# Patient Record
Sex: FEMALE | Race: ASIAN
[De-identification: names, ages, dates, MRNs, and addresses within clinical notes are randomized per-mention and may not be internally consistent; named-entity substitution may affect disease eponyms.]

---

## 2021-01-04 ENCOUNTER — TRANSCRIPTION ENCOUNTER (OUTPATIENT)
Age: 21
End: 2021-01-04

## 2021-01-04 ENCOUNTER — APPOINTMENT (OUTPATIENT)
Dept: FAMILY MEDICINE | Facility: CLINIC | Age: 21
End: 2021-01-04
Payer: MEDICAID

## 2021-01-04 VITALS
SYSTOLIC BLOOD PRESSURE: 121 MMHG | BODY MASS INDEX: 20.66 KG/M2 | OXYGEN SATURATION: 99 % | DIASTOLIC BLOOD PRESSURE: 80 MMHG | TEMPERATURE: 98.7 F | WEIGHT: 124 LBS | HEART RATE: 84 BPM | HEIGHT: 65 IN

## 2021-01-04 DIAGNOSIS — N92.6 IRREGULAR MENSTRUATION, UNSPECIFIED: ICD-10-CM

## 2021-01-04 DIAGNOSIS — H61.20 IMPACTED CERUMEN, UNSPECIFIED EAR: ICD-10-CM

## 2021-01-04 DIAGNOSIS — Z00.00 ENCOUNTER FOR GENERAL ADULT MEDICAL EXAMINATION W/OUT ABNORMAL FINDINGS: ICD-10-CM

## 2021-01-04 DIAGNOSIS — Z13.31 ENCOUNTER FOR SCREENING FOR DEPRESSION: ICD-10-CM

## 2021-01-04 DIAGNOSIS — Z78.9 OTHER SPECIFIED HEALTH STATUS: ICD-10-CM

## 2021-01-04 PROCEDURE — 99385 PREV VISIT NEW AGE 18-39: CPT

## 2021-01-04 PROCEDURE — G0444 DEPRESSION SCREEN ANNUAL: CPT | Mod: NC,59

## 2021-01-04 PROCEDURE — 99072 ADDL SUPL MATRL&STAF TM PHE: CPT

## 2021-01-04 RX ORDER — VITAMIN E (DL,TOCOPHERYL ACET) 180 MG
CAPSULE ORAL
Refills: 0 | Status: ACTIVE | COMMUNITY

## 2021-01-04 RX ORDER — CHROMIUM 200 MCG
TABLET ORAL
Refills: 0 | Status: ACTIVE | COMMUNITY

## 2021-01-04 RX ORDER — MV-MIN/FOLIC/VIT K/LYCOP/COQ10 200-100MCG
CAPSULE ORAL
Refills: 0 | Status: ACTIVE | COMMUNITY

## 2021-01-04 NOTE — ASSESSMENT
[FreeTextEntry1] : Health maintenance\par - labs ordered\par - declines flu shot\par - up to date with pap\par - depression screening negative\par \par Pain in left arm\par - likely muscles soreness\par - monitor\par - heating pad/ NSAIDs as needed\par \par Cerumen \par - advised to use debrox for excessive wax\par - if needed can return for irrigation\par \par Late period\par - only one period off cycle\par - advised that this can be normal, may be related to increased stressors\par - will monitor period cycles and if remaining abnormal can follow up with GYN\par

## 2021-01-04 NOTE — HEALTH RISK ASSESSMENT
[Excellent] : ~his/her~ current health as excellent [Very Good] : ~his/her~  mood as very good [0] : 2) Feeling down, depressed, or hopeless: Not at all (0) [Patient reported mammogram was normal] : Patient reported mammogram was normal [HIV test declined] : HIV test declined [Hepatitis C test declined] : Hepatitis C test declined [None] : None [With Family] : lives with family [Student] : student [College] : College [Fully functional (bathing, dressing, toileting, transferring, walking, feeding)] : Fully functional (bathing, dressing, toileting, transferring, walking, feeding) [Fully functional (using the telephone, shopping, preparing meals, housekeeping, doing laundry, using] : Fully functional and needs no help or supervision to perform IADLs (using the telephone, shopping, preparing meals, housekeeping, doing laundry, using transportation, managing medications and managing finances) [Smoke Detector] : smoke detector [Safety elements used in home] : safety elements used in home [Seat Belt] :  uses seat belt [No] : In the past 12 months have you used drugs other than those required for medical reasons? No [No falls in past year] : Patient reported no falls in the past year [Patient reported PAP Smear was normal] : Patient reported PAP Smear was normal [Single] : single [Feels Safe at Home] : Feels safe at home [Patient/Caregiver not ready to engage] : Patient/Caregiver not ready to engage [] : No [de-identified] : tries to exercise  [de-identified] : well balanced  [[id number]] : 0 [Change in mental status noted] : No change in mental status noted [Language] : denies difficulty with language [Behavior] : denies difficulty with behavior [Reasoning] : denies difficulty with reasoning [Sexually Active] : not sexually active [Reports changes in hearing] : Reports no changes in hearing [Reports changes in vision] : Reports no changes in vision [PapSmearDate] : 1/2019

## 2021-01-04 NOTE — HEALTH RISK ASSESSMENT
[Excellent] : ~his/her~ current health as excellent [Very Good] : ~his/her~  mood as very good [0] : 2) Feeling down, depressed, or hopeless: Not at all (0) [Patient reported mammogram was normal] : Patient reported mammogram was normal [HIV test declined] : HIV test declined [Hepatitis C test declined] : Hepatitis C test declined [None] : None [With Family] : lives with family [Student] : student [College] : College [Fully functional (bathing, dressing, toileting, transferring, walking, feeding)] : Fully functional (bathing, dressing, toileting, transferring, walking, feeding) [Fully functional (using the telephone, shopping, preparing meals, housekeeping, doing laundry, using] : Fully functional and needs no help or supervision to perform IADLs (using the telephone, shopping, preparing meals, housekeeping, doing laundry, using transportation, managing medications and managing finances) [Smoke Detector] : smoke detector [Safety elements used in home] : safety elements used in home [Seat Belt] :  uses seat belt [No] : In the past 12 months have you used drugs other than those required for medical reasons? No [No falls in past year] : Patient reported no falls in the past year [Patient reported PAP Smear was normal] : Patient reported PAP Smear was normal [Single] : single [Feels Safe at Home] : Feels safe at home [Patient/Caregiver not ready to engage] : Patient/Caregiver not ready to engage [] : No [de-identified] : tries to exercise  [de-identified] : well balanced  [[id number]] : 0 [Change in mental status noted] : No change in mental status noted [Language] : denies difficulty with language [Behavior] : denies difficulty with behavior [Reasoning] : denies difficulty with reasoning [Sexually Active] : not sexually active [Reports changes in hearing] : Reports no changes in hearing [Reports changes in vision] : Reports no changes in vision [PapSmearDate] : 1/2019

## 2021-01-04 NOTE — PHYSICAL EXAM
[No Acute Distress] : no acute distress [Well Nourished] : well nourished [Well Developed] : well developed [Normal Sclera/Conjunctiva] : normal sclera/conjunctiva [PERRL] : pupils equal round and reactive to light [Normal Outer Ear/Nose] : the outer ears and nose were normal in appearance [No JVD] : no jugular venous distention [No Respiratory Distress] : no respiratory distress  [No Accessory Muscle Use] : no accessory muscle use [Normal Rate] : normal rate  [Regular Rhythm] : with a regular rhythm [No Carotid Bruits] : no carotid bruits [No Edema] : there was no peripheral edema [Soft] : abdomen soft [Non Tender] : non-tender [No HSM] : no HSM [Normal Supraclavicular Nodes] : no supraclavicular lymphadenopathy [No Rash] : no rash [No Skin Lesions] : no skin lesions [Coordination Grossly Intact] : coordination grossly intact [Normal Mood] : the mood was normal [Normal] : no joint swelling and grossly normal strength and tone [No Focal Deficits] : no focal deficits [Normal Gait] : normal gait [Normal Affect] : the affect was normal [Normal Insight/Judgement] : insight and judgment were intact [de-identified] : Full ROM of LUE, no defecits appreciated

## 2021-01-04 NOTE — HISTORY OF PRESENT ILLNESS
[FreeTextEntry1] : CPE [de-identified] : Patient here for CPE, last saw PMD 1 year ago. Denies any medical history, but admits hx of syncope with blood work. Does not take any medications. Patient is in school. Tries to exercise but does not have much motivation. Lives with parents and family. \par Patient complains of arm soreness on left side. Happens at night when she sleeps on it. No precipitating events or triggers. No pain. Did not take any medications. Symptoms typically self resolve after 10-15 minutes. Does not lift weights or do heavy exercise. \par Patient also notes her period has been 10 days late this month. Periods have previously been regular and denies changes in flow or pattern. Patient also complains of ear wax which she says is a chronic issue, required irrigation in the past. No ear pain or discharge.\par \par Did not have dental screening this year\par Refuses flu shot\par PAP- 2019 UTD\par

## 2021-01-04 NOTE — REVIEW OF SYSTEMS
[Fainting] : fainting [Fever] : no fever [Chills] : no chills [Night Sweats] : no night sweats [Discharge] : no discharge [Pain] : no pain [Vision Problems] : no vision problems [Hearing Loss] : no hearing loss [Nasal Discharge] : no nasal discharge [Chest Pain] : no chest pain [Palpitations] : no palpitations [Shortness Of Breath] : no shortness of breath [Wheezing] : no wheezing [Abdominal Pain] : no abdominal pain [Nausea] : no nausea [Vomiting] : no vomiting [Dysuria] : no dysuria [Incontinence] : no incontinence [Hematuria] : no hematuria [Joint Pain] : no joint pain [Joint Stiffness] : no joint stiffness [Muscle Pain] : no muscle pain [Itching] : no itching [Skin Rash] : no skin rash [Headache] : no headache [Dizziness] : no dizziness [Memory Loss] : no memory loss [Suicidal] : not suicidal [Insomnia] : no insomnia [Easy Bleeding] : no easy bleeding [Easy Bruising] : no easy bruising [de-identified] : hx of fainting with blood work

## 2021-01-04 NOTE — PHYSICAL EXAM
[No Acute Distress] : no acute distress [Well Nourished] : well nourished [Well Developed] : well developed [Normal Sclera/Conjunctiva] : normal sclera/conjunctiva [PERRL] : pupils equal round and reactive to light [Normal Outer Ear/Nose] : the outer ears and nose were normal in appearance [No JVD] : no jugular venous distention [No Respiratory Distress] : no respiratory distress  [No Accessory Muscle Use] : no accessory muscle use [Normal Rate] : normal rate  [Regular Rhythm] : with a regular rhythm [No Carotid Bruits] : no carotid bruits [No Edema] : there was no peripheral edema [Soft] : abdomen soft [Non Tender] : non-tender [No HSM] : no HSM [Normal Supraclavicular Nodes] : no supraclavicular lymphadenopathy [No Rash] : no rash [No Skin Lesions] : no skin lesions [Coordination Grossly Intact] : coordination grossly intact [Normal Mood] : the mood was normal [Normal] : no joint swelling and grossly normal strength and tone [No Focal Deficits] : no focal deficits [Normal Gait] : normal gait [Normal Affect] : the affect was normal [Normal Insight/Judgement] : insight and judgment were intact [de-identified] : Full ROM of LUE, no defecits appreciated

## 2021-01-04 NOTE — REVIEW OF SYSTEMS
[Fainting] : fainting [Fever] : no fever [Chills] : no chills [Night Sweats] : no night sweats [Discharge] : no discharge [Pain] : no pain [Vision Problems] : no vision problems [Hearing Loss] : no hearing loss [Nasal Discharge] : no nasal discharge [Chest Pain] : no chest pain [Palpitations] : no palpitations [Shortness Of Breath] : no shortness of breath [Wheezing] : no wheezing [Abdominal Pain] : no abdominal pain [Nausea] : no nausea [Vomiting] : no vomiting [Dysuria] : no dysuria [Incontinence] : no incontinence [Hematuria] : no hematuria [Joint Pain] : no joint pain [Joint Stiffness] : no joint stiffness [Muscle Pain] : no muscle pain [Itching] : no itching [Skin Rash] : no skin rash [Headache] : no headache [Dizziness] : no dizziness [Memory Loss] : no memory loss [Suicidal] : not suicidal [Insomnia] : no insomnia [Easy Bleeding] : no easy bleeding [Easy Bruising] : no easy bruising [de-identified] : hx of fainting with blood work

## 2021-01-04 NOTE — HISTORY OF PRESENT ILLNESS
[FreeTextEntry1] : CPE [de-identified] : Patient here for CPE, last saw PMD 1 year ago. Denies any medical history, but admits hx of syncope with blood work. Does not take any medications. Patient is in school. Tries to exercise but does not have much motivation. Lives with parents and family. \par Patient complains of arm soreness on left side. Happens at night when she sleeps on it. No precipitating events or triggers. No pain. Did not take any medications. Symptoms typically self resolve after 10-15 minutes. Does not lift weights or do heavy exercise. \par Patient also notes her period has been 10 days late this month. Periods have previously been regular and denies changes in flow or pattern. Patient also complains of ear wax which she says is a chronic issue, required irrigation in the past. No ear pain or discharge.\par \par Did not have dental screening this year\par Refuses flu shot\par PAP- 2019 UTD\par

## 2021-01-07 ENCOUNTER — TRANSCRIPTION ENCOUNTER (OUTPATIENT)
Age: 21
End: 2021-01-07

## 2021-01-07 LAB
ALBUMIN SERPL ELPH-MCNC: 4.6 G/DL
ALP BLD-CCNC: 87 U/L
ALT SERPL-CCNC: 14 U/L
ANION GAP SERPL CALC-SCNC: 12 MMOL/L
AST SERPL-CCNC: 19 U/L
BASOPHILS # BLD AUTO: 0.05 K/UL
BASOPHILS NFR BLD AUTO: 0.8 %
BILIRUB SERPL-MCNC: 0.8 MG/DL
BUN SERPL-MCNC: 13 MG/DL
CALCIUM SERPL-MCNC: 9.4 MG/DL
CHLORIDE SERPL-SCNC: 100 MMOL/L
CHOLEST SERPL-MCNC: 163 MG/DL
CO2 SERPL-SCNC: 27 MMOL/L
CREAT SERPL-MCNC: 0.79 MG/DL
EOSINOPHIL # BLD AUTO: 0.06 K/UL
EOSINOPHIL NFR BLD AUTO: 1 %
GLUCOSE SERPL-MCNC: 97 MG/DL
HCT VFR BLD CALC: 44.2 %
HDLC SERPL-MCNC: 61 MG/DL
HGB BLD-MCNC: 14.7 G/DL
IMM GRANULOCYTES NFR BLD AUTO: 0.3 %
LDLC SERPL CALC-MCNC: 85 MG/DL
LYMPHOCYTES # BLD AUTO: 1.79 K/UL
LYMPHOCYTES NFR BLD AUTO: 29.6 %
MAN DIFF?: NORMAL
MCHC RBC-ENTMCNC: 28.5 PG
MCHC RBC-ENTMCNC: 33.3 GM/DL
MCV RBC AUTO: 85.7 FL
MONOCYTES # BLD AUTO: 0.35 K/UL
MONOCYTES NFR BLD AUTO: 5.8 %
NEUTROPHILS # BLD AUTO: 3.77 K/UL
NEUTROPHILS NFR BLD AUTO: 62.5 %
NONHDLC SERPL-MCNC: 102 MG/DL
PLATELET # BLD AUTO: 187 K/UL
POTASSIUM SERPL-SCNC: 4.3 MMOL/L
PROT SERPL-MCNC: 7.5 G/DL
RBC # BLD: 5.16 M/UL
RBC # FLD: 11.8 %
SODIUM SERPL-SCNC: 139 MMOL/L
T4 FREE SERPL-MCNC: 1.3 NG/DL
TRIGL SERPL-MCNC: 84 MG/DL
TSH SERPL-ACNC: 2.1 UIU/ML
WBC # FLD AUTO: 6.04 K/UL

## 2021-01-08 ENCOUNTER — TRANSCRIPTION ENCOUNTER (OUTPATIENT)
Age: 21
End: 2021-01-08

## 2021-01-14 ENCOUNTER — TRANSCRIPTION ENCOUNTER (OUTPATIENT)
Age: 21
End: 2021-01-14

## 2021-01-26 ENCOUNTER — APPOINTMENT (OUTPATIENT)
Dept: ORTHOPEDIC SURGERY | Facility: CLINIC | Age: 21
End: 2021-01-26
Payer: MEDICAID

## 2021-01-26 DIAGNOSIS — M79.602 PAIN IN LEFT ARM: ICD-10-CM

## 2021-01-26 PROCEDURE — 99072 ADDL SUPL MATRL&STAF TM PHE: CPT

## 2021-01-26 PROCEDURE — 73030 X-RAY EXAM OF SHOULDER: CPT | Mod: LT

## 2021-01-26 PROCEDURE — 99204 OFFICE O/P NEW MOD 45 MIN: CPT

## 2021-01-27 NOTE — PHYSICAL EXAM
[de-identified] : Physical Exam:\par General: Well appearing, no acute distress\par Neurologic: A&Ox3, No focal deficits\par Head: NCAT without abrasions, lacerations, or ecchymosis to head, face, or scalp\par Eyes: No scleral icterus, no gross abnormalities\par Respiratory: Equal chest wall expansion bilaterally, no accessory muscle use\par Lymphatic: No lymphadenopathy palpated\par Skin: Warm and dry\par Psychiatric: Normal mood and affect\par \par C-Spine\par Palpation: Nontender to paraspinal muscles and trapezius muscle\par ROM: side bending, symmetrical. No pain with extension and flexion of the neck.\par Reflexes: C5-7 [normal]\par \par Left Shoulder\par ·	Inspection/Palpation: Tenderness to deltoid insertion, no swelling or deformities\par ·	Range of Motion: no crepitus with ROM; Active/Passive FF 0-170; ER at side 0-45; IR to T7 \par Painful ROM: pain to deltoid with resisted shoulder flexion\par ·	Strength: forward elevation in scapular plane [4/5], internal rotation [4/5], external rotation [4/5], adduction [4/5] and abduction [4/5]\par ·	Stability: no joint instability on provocative testing\par ·	Tests: Duffy test negative, Neer negative, drop arm test negative, bear hug test negative, Napolean sign negative, cross arm adduction negative, lift off sign negative, hornblowers sign negative, speeds test NEG, Yergason's test NEG, no bicipital groove tenderness, Mcmahon's Active Compression test POS, whipple test NEG, bicep's load II test negative\par \par RightShoulder\par ·	Inspection/Palpation: no tenderness, swelling or deformities\par ·	Range of Motion: full and painless in all planes, no crepitus\par ·	Strength: forward elevation in scapular plane 5/5, internal rotation 5/5, external rotation 5/5, adduction 5/5 and abduction 5/5\par ·	Stability: no joint instability on provocative testing\par ·	Tests: Duffy test negative, Neer sign negative, negative drop arm test secondary to pain, bear hug test negative, Napolean sign negative, cross arm adduction negative, lift off sign positive, hornblowers sign negative, speeds test negative, Yergason's test negative, no bicipital groove tenderness, Mcmahon's Active Compression test negative [de-identified] : The following radiographs were ordered and read by me during this patients visit. I reviewed each radiograph in detail with the patient and discussed the findings as highlighted below. \par \par 4 views of the left shoulder show no fracture, dislocation or bony lesions. There is no evidence of degenerative change in the glenohumeral and acromioclavicular joints with maintenance of the joint space. Otherwise unremarkable. Radiographs taken today visualize the entire humerus which is also WNL.

## 2021-01-27 NOTE — REVIEW OF SYSTEMS
[Negative] : Heme/Lymph [Joint Pain] : no joint pain [Joint Stiffness] : no joint stiffness [Joint Swelling] : no joint swelling [FreeTextEntry9] : left arm pain

## 2021-01-27 NOTE — HISTORY OF PRESENT ILLNESS
[de-identified] : NORMA is a 20 year female who presents today with complaints of L arm pain located over the deltoid insertion. She denies injury, numbness/tingling. She admits to this pain starting over a year ago but it is now more constant. She denies sports and admits to being in school but not working.

## 2021-01-27 NOTE — DISCUSSION/SUMMARY
[de-identified] : NORMA is a 20 year female who presents today with left arm pain secondary to deltoid tendonitis. We had a thorough discussion regarding the nature of her pain, the pathophysiology, as well as all treatment options. Reviewed her radiographs taken today that reveal no bony deformities or masses to this area and are WNL. At this time I recommend a course of Mobic to take as directed with food. She will d/c use if GI upset occurs. She will also start PT 2x/week for 4 weeks for this issue. If she is pain free at that time she will follow up with us on an prn basis. If she is refractory we will then consider an MRI. She agrees with the above plan and all questions were answered.\par

## 2021-01-28 RX ORDER — MELOXICAM 7.5 MG/1
7.5 TABLET ORAL
Qty: 21 | Refills: 0 | Status: COMPLETED | COMMUNITY
Start: 2021-01-26 | End: 2021-02-18

## 2021-02-13 ENCOUNTER — TRANSCRIPTION ENCOUNTER (OUTPATIENT)
Age: 21
End: 2021-02-13

## 2021-02-14 ENCOUNTER — TRANSCRIPTION ENCOUNTER (OUTPATIENT)
Age: 21
End: 2021-02-14

## 2021-03-04 ENCOUNTER — APPOINTMENT (OUTPATIENT)
Dept: ORTHOPEDIC SURGERY | Facility: CLINIC | Age: 21
End: 2021-03-04
Payer: MEDICAID

## 2021-03-04 PROCEDURE — ZZZZZ: CPT

## 2021-07-18 ENCOUNTER — TRANSCRIPTION ENCOUNTER (OUTPATIENT)
Age: 21
End: 2021-07-18

## 2021-08-02 ENCOUNTER — TRANSCRIPTION ENCOUNTER (OUTPATIENT)
Age: 21
End: 2021-08-02

## 2021-08-02 ENCOUNTER — NON-APPOINTMENT (OUTPATIENT)
Age: 21
End: 2021-08-02

## 2021-10-07 ENCOUNTER — APPOINTMENT (OUTPATIENT)
Dept: ORTHOPEDIC SURGERY | Facility: CLINIC | Age: 21
End: 2021-10-07
Payer: MEDICAID

## 2021-10-07 DIAGNOSIS — R29.3 ABNORMAL POSTURE: ICD-10-CM

## 2021-10-07 DIAGNOSIS — M77.8 OTHER ENTHESOPATHIES, NOT ELSEWHERE CLASSIFIED: ICD-10-CM

## 2021-10-07 DIAGNOSIS — M25.50 PAIN IN UNSPECIFIED JOINT: ICD-10-CM

## 2021-10-07 PROCEDURE — 73030 X-RAY EXAM OF SHOULDER: CPT | Mod: LT

## 2021-10-07 PROCEDURE — 99214 OFFICE O/P EST MOD 30 MIN: CPT

## 2021-10-07 RX ORDER — DICLOFENAC SODIUM 1% 10 MG/G
1 GEL TOPICAL
Qty: 1 | Refills: 0 | Status: ACTIVE | COMMUNITY
Start: 2021-10-07 | End: 1900-01-01

## 2021-10-07 NOTE — DISCUSSION/SUMMARY
[de-identified] : NORMA is a 20 year female who presents today with left arm pain secondary to deltoid tendonitis. We had a thorough discussion regarding the nature of her pain, the pathophysiology, as well as all treatment options. Reviewed her radiographs taken today that reveal no bony deformities or masses to this area and are WNL. At this time I recommend she start PT 2x/week for 4 weeks for this issue. She was prescribed voltaren gel due to her GI upset with oral NSAIDs. If she is pain free at that time she will follow up with us on an prn basis. If she is refractory we will then consider an MRI. She agrees with the above plan and all questions were answered.\par \par \par Of note she mentions new joint pain to BILAT wrists/ hands and within her fingers. Due to this I have referred her to a rheumatologist for bloodwork.

## 2021-10-07 NOTE — HISTORY OF PRESENT ILLNESS
[Worsening] : worsening [___ mths] : [unfilled] month(s) ago [2] : an average pain level of 2/10 [1] : a minimum pain level of 1/10 [3] : a maximum pain level of 3/10 [Lifting] : worsened by lifting [de-identified] : NORMA is a 20 year female who presents today with complaints of L shoulder pain. She was last here 01/26/2021 and d/x with deltoid tendinitis. Currently, she reports con't L shoulder pain, specifically with lifting. She states her pain is now radiating from her deltoid over her lateral shoulder to her neck. Patient denies numbness and tingling to the extremities. She reports no relief with meloxicam but does report GI upset so she was forced to stop it. She has not performed any physical therapy although suggested to but performed HEP as provided without relief. Her pain worsened after COVID vaccination 1.5 weeks ago. This was her second injection. Prior in the left arm was her first COVID vaccination which also caused an increase in her pain at that time.\par \par Of note she mentions new joint pain to BILAT wrists/ hands and within her fingers.

## 2021-10-07 NOTE — PHYSICAL EXAM
[de-identified] : Physical Exam:\par General: Well appearing, no acute distress\par Neurologic: A&Ox3, No focal deficits\par Head: NCAT without abrasions, lacerations, or ecchymosis to head, face, or scalp\par Eyes: No scleral icterus, no gross abnormalities\par Respiratory: Equal chest wall expansion bilaterally, no accessory muscle use\par Lymphatic: No lymphadenopathy palpated\par Skin: Warm and dry\par Psychiatric: Normal mood and affect\par \par C-Spine\par Palpation: Nontender to paraspinal muscles and trapezius muscle\par ROM: side bending, symmetrical. No pain with extension and flexion of the neck.\par Reflexes: C5-7 [normal]\par \par Left Shoulder\par ·	Inspection/Palpation: Tenderness to deltoid insertion, no swelling or deformities\par ·	Range of Motion: no crepitus with ROM; Active/Passive FF 0-170; ER at side 0-45; IR to T7 \par Painful ROM: pain to deltoid with resisted shoulder flexion\par ·	Strength: forward elevation in scapular plane [4/5], internal rotation [4/5], external rotation [4/5], adduction [4/5] and abduction [4/5]\par ·	Stability: no joint instability on provocative testing\par ·	Tests: Duffy test negative, Neer negative, drop arm test negative, bear hug test negative, Napolean sign negative, cross arm adduction negative, lift off sign negative, hornblowers sign negative, speeds test NEG, Yergason's test NEG, no bicipital groove tenderness, Mcmahon's Active Compression test POS, whipple test NEG, bicep's load II test negative\par \par RightShoulder\par ·	Inspection/Palpation: no tenderness, swelling or deformities\par ·	Range of Motion: full and painless in all planes, no crepitus\par ·	Strength: forward elevation in scapular plane 5/5, internal rotation 5/5, external rotation 5/5, adduction 5/5 and abduction 5/5\par ·	Stability: no joint instability on provocative testing\par ·	Tests: Duffy test negative, Neer sign negative, negative drop arm test secondary to pain, bear hug test negative, Napolean sign negative, cross arm adduction negative, lift off sign positive, hornblowers sign negative, speeds test negative, Yergason's test negative, no bicipital groove tenderness, Mcmahon's Active Compression test negative [de-identified] : The following radiographs were ordered and read by me during this patients visit. I reviewed each radiograph in detail with the patient and discussed the findings as highlighted below. \par \par 4 views of the left shoulder show no fracture, dislocation or bony lesions. There is no evidence of degenerative change in the glenohumeral and acromioclavicular joints with maintenance of the joint space. Otherwise unremarkable. Radiographs taken today visualize the entire humerus which is also WNL.

## 2021-11-03 ENCOUNTER — APPOINTMENT (OUTPATIENT)
Dept: DERMATOLOGY | Facility: CLINIC | Age: 21
End: 2021-11-03

## 2022-01-07 ENCOUNTER — APPOINTMENT (OUTPATIENT)
Dept: FAMILY MEDICINE | Facility: CLINIC | Age: 22
End: 2022-01-07

## 2022-03-01 ENCOUNTER — APPOINTMENT (OUTPATIENT)
Dept: RHEUMATOLOGY | Facility: CLINIC | Age: 22
End: 2022-03-01

## 2022-03-03 ENCOUNTER — APPOINTMENT (OUTPATIENT)
Dept: RHEUMATOLOGY | Facility: CLINIC | Age: 22
End: 2022-03-03

## 2022-06-12 ENCOUNTER — NON-APPOINTMENT (OUTPATIENT)
Age: 22
End: 2022-06-12

## 2022-07-10 ENCOUNTER — NON-APPOINTMENT (OUTPATIENT)
Age: 22
End: 2022-07-10

## 2022-07-15 ENCOUNTER — NON-APPOINTMENT (OUTPATIENT)
Age: 22
End: 2022-07-15

## 2022-09-17 ENCOUNTER — NON-APPOINTMENT (OUTPATIENT)
Age: 22
End: 2022-09-17

## 2022-11-11 ENCOUNTER — EMERGENCY (EMERGENCY)
Facility: HOSPITAL | Age: 22
LOS: 0 days | Discharge: HOME | End: 2022-11-11
Attending: EMERGENCY MEDICINE
Payer: MEDICAID

## 2022-11-11 VITALS
SYSTOLIC BLOOD PRESSURE: 124 MMHG | HEART RATE: 116 BPM | TEMPERATURE: 100 F | RESPIRATION RATE: 18 BRPM | DIASTOLIC BLOOD PRESSURE: 83 MMHG | OXYGEN SATURATION: 99 %

## 2022-11-11 VITALS — WEIGHT: 126.1 LBS | HEIGHT: 65 IN

## 2022-11-11 DIAGNOSIS — M54.2 CERVICALGIA: ICD-10-CM

## 2022-11-11 DIAGNOSIS — R50.9 FEVER, UNSPECIFIED: ICD-10-CM

## 2022-11-11 DIAGNOSIS — R06.02 SHORTNESS OF BREATH: ICD-10-CM

## 2022-11-11 DIAGNOSIS — Z20.822 CONTACT WITH AND (SUSPECTED) EXPOSURE TO COVID-19: ICD-10-CM

## 2022-11-11 DIAGNOSIS — B34.9 VIRAL INFECTION, UNSPECIFIED: ICD-10-CM

## 2022-11-11 LAB
RAPID RVP RESULT: SIGNIFICANT CHANGE UP
SARS-COV-2 RNA SPEC QL NAA+PROBE: SIGNIFICANT CHANGE UP

## 2022-11-11 PROCEDURE — 99284 EMERGENCY DEPT VISIT MOD MDM: CPT

## 2022-11-11 PROCEDURE — 71046 X-RAY EXAM CHEST 2 VIEWS: CPT | Mod: 26

## 2022-11-11 PROCEDURE — 0225U NFCT DS DNA&RNA 21 SARSCOV2: CPT

## 2022-11-11 PROCEDURE — 71046 X-RAY EXAM CHEST 2 VIEWS: CPT

## 2022-11-11 PROCEDURE — 99285 EMERGENCY DEPT VISIT HI MDM: CPT | Mod: 25

## 2022-11-11 PROCEDURE — 93010 ELECTROCARDIOGRAM REPORT: CPT

## 2022-11-11 PROCEDURE — 93005 ELECTROCARDIOGRAM TRACING: CPT

## 2022-11-11 RX ORDER — IBUPROFEN 200 MG
600 TABLET ORAL ONCE
Refills: 0 | Status: COMPLETED | OUTPATIENT
Start: 2022-11-11 | End: 2022-11-11

## 2022-11-11 RX ADMIN — Medication 600 MILLIGRAM(S): at 15:06

## 2022-11-11 NOTE — ED ADULT NURSE NOTE - OBJECTIVE STATEMENT
Patient is alert and oriented X3, presenting to the ER with c/o shortness of breath. Patient reports the shortness of breath started about 3-4 days ago. Denies CP, vomiting, recent sick contact, difficulty swallowing. Took antibiotic prior to arrival.

## 2022-11-11 NOTE — ED ADULT NURSE NOTE - CHIEF COMPLAINT QUOTE
Patient is alert and oriented X3, presenting to the ER with c/o shortness of breath. Patient reports "the shortness of breath started about 3-4 days ago. I went to the doctor 2-3 weeks ago for the swelling in my neck, they placed me on antibiotics. I had my follow up appointment 2 days ago and they placed me on a third round of antibiotics but its not getting better." Denies CP, vomiting, recent sick contact, difficulty swallowing. Took antibiotic prior to arrival. Patient taken in for EKG. Respirations even and unlabored, air way patent, O2 saturation 97% on room air, heart rate 108, no visible signs of distress noted.

## 2022-11-11 NOTE — ED ADULT TRIAGE NOTE - CHIEF COMPLAINT QUOTE
Patient is alert and oriented X3, presenting to the ER with c/o shortness of breath. Patient reports "the shortness of breath started about 3-4 days ago. I went to the doctor 2-3 weeks ago for the swelling in my neck, they placed me on antibiotics. I had my follow up appointment 2 days ago and they placed me on a third round of antibiotics but its not getting better." Denies CP, vomiting, recent sick contact, difficulty swallowing. Took antibiotic prior to arrival. Patient taken in for EKG. Patient is alert and oriented X3, presenting to the ER with c/o shortness of breath. Patient reports "the shortness of breath started about 3-4 days ago. I went to the doctor 2-3 weeks ago for the swelling in my neck, they placed me on antibiotics. I had my follow up appointment 2 days ago and they placed me on a third round of antibiotics but its not getting better." Denies CP, vomiting, recent sick contact, difficulty swallowing. Took antibiotic prior to arrival. Patient taken in for EKG. Respirations even and unlabored, air way patent, O2 saturation 97% on room air, heart rate 108, no visible signs of distress noted.

## 2022-11-11 NOTE — ED STATDOCS - PATIENT PORTAL LINK FT
You can access the FollowMyHealth Patient Portal offered by [facility] by registering at the following website: [email or web address]. By joining [employer]’s FollowMyHealth portal, you will also be able to view your health information using other applications (apps) compatible with our system.

## 2022-11-11 NOTE — ED STATDOCS - ENMT, MLM
Nasal mucosa clear.  Mouth with normal mucosa  Throat has no vesicles, no oropharyngeal exudates and uvula is midline. Tenderness anterior left cervical adenopathy, no skin changes, normal oropharynx, no trismus or drooling, normal ROM of neck

## 2022-11-11 NOTE — ED STATDOCS - NS ED MD DISPO DISCHARGE CCDA
US rt thora.  8F.  1.7 l thin straw colored fluid. No request for testing. Comp-none. Patient/Caregiver provided printed discharge information.

## 2022-11-11 NOTE — ED STATDOCS - PROGRESS NOTE DETAILS
pt aware of cxr results and will fu with swab results. pt agrees with plan and well appearing on dc. -Lindsay Todd PA-C

## 2022-11-11 NOTE — ED STATDOCS - OBJECTIVE STATEMENT
23 y/o female w/ no pertinent PMHx presents to the ED c/o SOB, fever, and body aches x3-4 days. Pt notes she saw PCP x2-3 weeks ago for neck swelling/pain and was placed on ABx, had a follow up 2-3 days ago and was placed on a 3rd round of abx as swelling wasn't getting better. Pt has been on Amoxicillin, Clindamycin, and Cephalexin, states the Clindamycin did help a little. Denies CP, vomiting, or sick contacts. No other complaints at this time. NKDA.

## 2022-11-13 ENCOUNTER — INPATIENT (INPATIENT)
Facility: HOSPITAL | Age: 22
LOS: 3 days | Discharge: HOME | DRG: 651 | End: 2022-11-17
Attending: FAMILY MEDICINE | Admitting: EMERGENCY MEDICINE
Payer: MEDICAID

## 2022-11-13 VITALS — HEIGHT: 65 IN | WEIGHT: 123.9 LBS

## 2022-11-13 DIAGNOSIS — R50.81 FEVER PRESENTING WITH CONDITIONS CLASSIFIED ELSEWHERE: ICD-10-CM

## 2022-11-13 DIAGNOSIS — D70.9 NEUTROPENIA, UNSPECIFIED: ICD-10-CM

## 2022-11-13 DIAGNOSIS — D69.6 THROMBOCYTOPENIA, UNSPECIFIED: ICD-10-CM

## 2022-11-13 DIAGNOSIS — E83.51 HYPOCALCEMIA: ICD-10-CM

## 2022-11-13 DIAGNOSIS — E43 UNSPECIFIED SEVERE PROTEIN-CALORIE MALNUTRITION: ICD-10-CM

## 2022-11-13 DIAGNOSIS — R59.0 LOCALIZED ENLARGED LYMPH NODES: ICD-10-CM

## 2022-11-13 PROBLEM — Z78.9 OTHER SPECIFIED HEALTH STATUS: Chronic | Status: ACTIVE | Noted: 2022-11-12

## 2022-11-13 LAB
ADD ON TEST-SPECIMEN IN LAB: SIGNIFICANT CHANGE UP
ADD ON TEST-SPECIMEN IN LAB: SIGNIFICANT CHANGE UP
ALBUMIN SERPL ELPH-MCNC: 3.3 G/DL — SIGNIFICANT CHANGE UP (ref 3.3–5)
ALP SERPL-CCNC: 67 U/L — SIGNIFICANT CHANGE UP (ref 40–120)
ALT FLD-CCNC: 29 U/L — SIGNIFICANT CHANGE UP (ref 12–78)
ANION GAP SERPL CALC-SCNC: 5 MMOL/L — SIGNIFICANT CHANGE UP (ref 5–17)
APPEARANCE UR: CLEAR — SIGNIFICANT CHANGE UP
AST SERPL-CCNC: 36 U/L — SIGNIFICANT CHANGE UP (ref 15–37)
BASOPHILS # BLD AUTO: 0 K/UL — SIGNIFICANT CHANGE UP (ref 0–0.2)
BASOPHILS NFR BLD AUTO: 0 % — SIGNIFICANT CHANGE UP (ref 0–2)
BILIRUB SERPL-MCNC: 0.6 MG/DL — SIGNIFICANT CHANGE UP (ref 0.2–1.2)
BILIRUB UR-MCNC: NEGATIVE — SIGNIFICANT CHANGE UP
BUN SERPL-MCNC: 8 MG/DL — SIGNIFICANT CHANGE UP (ref 7–23)
CALCIUM SERPL-MCNC: 8.5 MG/DL — SIGNIFICANT CHANGE UP (ref 8.5–10.1)
CHLORIDE SERPL-SCNC: 103 MMOL/L — SIGNIFICANT CHANGE UP (ref 96–108)
CO2 SERPL-SCNC: 28 MMOL/L — SIGNIFICANT CHANGE UP (ref 22–31)
COLOR SPEC: YELLOW — SIGNIFICANT CHANGE UP
CREAT SERPL-MCNC: 0.74 MG/DL — SIGNIFICANT CHANGE UP (ref 0.5–1.3)
CULTURE RESULTS: SIGNIFICANT CHANGE UP
DIFF PNL FLD: ABNORMAL
EGFR: 117 ML/MIN/1.73M2 — SIGNIFICANT CHANGE UP
EOSINOPHIL # BLD AUTO: 0 K/UL — SIGNIFICANT CHANGE UP (ref 0–0.5)
EOSINOPHIL NFR BLD AUTO: 0 % — SIGNIFICANT CHANGE UP (ref 0–6)
GLUCOSE SERPL-MCNC: 104 MG/DL — HIGH (ref 70–99)
GLUCOSE UR QL: NEGATIVE — SIGNIFICANT CHANGE UP
HCT VFR BLD CALC: 38.3 % — SIGNIFICANT CHANGE UP (ref 34.5–45)
HGB BLD-MCNC: 12.8 G/DL — SIGNIFICANT CHANGE UP (ref 11.5–15.5)
IMM GRANULOCYTES NFR BLD AUTO: 0.9 % — SIGNIFICANT CHANGE UP (ref 0–0.9)
KETONES UR-MCNC: ABNORMAL
LACTATE SERPL-SCNC: 0.7 MMOL/L — SIGNIFICANT CHANGE UP (ref 0.7–2)
LEUKOCYTE ESTERASE UR-ACNC: ABNORMAL
LYMPHOCYTES # BLD AUTO: 0.41 K/UL — LOW (ref 1–3.3)
LYMPHOCYTES # BLD AUTO: 37.3 % — SIGNIFICANT CHANGE UP (ref 13–44)
MCHC RBC-ENTMCNC: 26.9 PG — LOW (ref 27–34)
MCHC RBC-ENTMCNC: 33.4 GM/DL — SIGNIFICANT CHANGE UP (ref 32–36)
MCV RBC AUTO: 80.5 FL — SIGNIFICANT CHANGE UP (ref 80–100)
MONOCYTES # BLD AUTO: 0.12 K/UL — SIGNIFICANT CHANGE UP (ref 0–0.9)
MONOCYTES NFR BLD AUTO: 10.9 % — SIGNIFICANT CHANGE UP (ref 2–14)
NEUTROPHILS # BLD AUTO: 0.56 K/UL — LOW (ref 1.8–7.4)
NEUTROPHILS NFR BLD AUTO: 50.9 % — SIGNIFICANT CHANGE UP (ref 43–77)
NITRITE UR-MCNC: NEGATIVE — SIGNIFICANT CHANGE UP
PH UR: 7 — SIGNIFICANT CHANGE UP (ref 5–8)
PLATELET # BLD AUTO: 72 K/UL — LOW (ref 150–400)
POTASSIUM SERPL-MCNC: 3.9 MMOL/L — SIGNIFICANT CHANGE UP (ref 3.5–5.3)
POTASSIUM SERPL-SCNC: 3.9 MMOL/L — SIGNIFICANT CHANGE UP (ref 3.5–5.3)
PROT SERPL-MCNC: 7.6 GM/DL — SIGNIFICANT CHANGE UP (ref 6–8.3)
PROT UR-MCNC: 30 MG/DL
RAPID RVP RESULT: SIGNIFICANT CHANGE UP
RBC # BLD: 4.76 M/UL — SIGNIFICANT CHANGE UP (ref 3.8–5.2)
RBC # FLD: 12 % — SIGNIFICANT CHANGE UP (ref 10.3–14.5)
S PYO AG SPEC QL IA: NEGATIVE — SIGNIFICANT CHANGE UP
SARS-COV-2 RNA SPEC QL NAA+PROBE: SIGNIFICANT CHANGE UP
SODIUM SERPL-SCNC: 136 MMOL/L — SIGNIFICANT CHANGE UP (ref 135–145)
SP GR SPEC: 1 — LOW (ref 1.01–1.02)
SPECIMEN SOURCE: SIGNIFICANT CHANGE UP
UROBILINOGEN FLD QL: 4
WBC # BLD: 1.1 K/UL — LOW (ref 3.8–10.5)
WBC # FLD AUTO: 1.1 K/UL — LOW (ref 3.8–10.5)

## 2022-11-13 PROCEDURE — 87040 BLOOD CULTURE FOR BACTERIA: CPT

## 2022-11-13 PROCEDURE — 83735 ASSAY OF MAGNESIUM: CPT

## 2022-11-13 PROCEDURE — 88305 TISSUE EXAM BY PATHOLOGIST: CPT

## 2022-11-13 PROCEDURE — 82550 ASSAY OF CK (CPK): CPT

## 2022-11-13 PROCEDURE — 85652 RBC SED RATE AUTOMATED: CPT

## 2022-11-13 PROCEDURE — 87389 HIV-1 AG W/HIV-1&-2 AB AG IA: CPT

## 2022-11-13 PROCEDURE — 83550 IRON BINDING TEST: CPT

## 2022-11-13 PROCEDURE — 87206 SMEAR FLUORESCENT/ACID STAI: CPT

## 2022-11-13 PROCEDURE — 99223 1ST HOSP IP/OBS HIGH 75: CPT

## 2022-11-13 PROCEDURE — 81340 TRB@ GENE REARRANGE AMPLIFY: CPT

## 2022-11-13 PROCEDURE — 81207 BCR/ABL1 GENE MINOR BP: CPT

## 2022-11-13 PROCEDURE — 81261 IGH GENE REARRANGE AMP METH: CPT

## 2022-11-13 PROCEDURE — 86663 EPSTEIN-BARR ANTIBODY: CPT

## 2022-11-13 PROCEDURE — 84100 ASSAY OF PHOSPHORUS: CPT

## 2022-11-13 PROCEDURE — 81342 TRG GENE REARRANGEMENT ANAL: CPT

## 2022-11-13 PROCEDURE — 83605 ASSAY OF LACTIC ACID: CPT

## 2022-11-13 PROCEDURE — 87102 FUNGUS ISOLATION CULTURE: CPT

## 2022-11-13 PROCEDURE — 86665 EPSTEIN-BARR CAPSID VCA: CPT

## 2022-11-13 PROCEDURE — 81206 BCR/ABL1 GENE MAJOR BP: CPT

## 2022-11-13 PROCEDURE — 87070 CULTURE OTHR SPECIMN AEROBIC: CPT

## 2022-11-13 PROCEDURE — 88172 CYTP DX EVAL FNA 1ST EA SITE: CPT

## 2022-11-13 PROCEDURE — 85025 COMPLETE CBC W/AUTO DIFF WBC: CPT

## 2022-11-13 PROCEDURE — 86359 T CELLS TOTAL COUNT: CPT

## 2022-11-13 PROCEDURE — 83615 LACTATE (LD) (LDH) ENZYME: CPT

## 2022-11-13 PROCEDURE — 84550 ASSAY OF BLOOD/URIC ACID: CPT

## 2022-11-13 PROCEDURE — 99285 EMERGENCY DEPT VISIT HI MDM: CPT

## 2022-11-13 PROCEDURE — 87205 SMEAR GRAM STAIN: CPT

## 2022-11-13 PROCEDURE — 88185 FLOWCYTOMETRY/TC ADD-ON: CPT

## 2022-11-13 PROCEDURE — 82728 ASSAY OF FERRITIN: CPT

## 2022-11-13 PROCEDURE — 38505 NEEDLE BIOPSY LYMPH NODES: CPT

## 2022-11-13 PROCEDURE — 83540 ASSAY OF IRON: CPT

## 2022-11-13 PROCEDURE — 81264 IGK REARRANGEABN CLONAL POP: CPT

## 2022-11-13 PROCEDURE — 87086 URINE CULTURE/COLONY COUNT: CPT

## 2022-11-13 PROCEDURE — 0225U NFCT DS DNA&RNA 21 SARSCOV2: CPT

## 2022-11-13 PROCEDURE — 88237 TISSUE CULTURE BONE MARROW: CPT

## 2022-11-13 PROCEDURE — 76536 US EXAM OF HEAD AND NECK: CPT | Mod: 26

## 2022-11-13 PROCEDURE — 81219 CALR GENE COM VARIANTS: CPT

## 2022-11-13 PROCEDURE — 85610 PROTHROMBIN TIME: CPT

## 2022-11-13 PROCEDURE — 80048 BASIC METABOLIC PNL TOTAL CA: CPT

## 2022-11-13 PROCEDURE — 86664 EPSTEIN-BARR NUCLEAR ANTIGEN: CPT

## 2022-11-13 PROCEDURE — 87798 DETECT AGENT NOS DNA AMP: CPT

## 2022-11-13 PROCEDURE — 87116 MYCOBACTERIA CULTURE: CPT

## 2022-11-13 PROCEDURE — 86357 NK CELLS TOTAL COUNT: CPT

## 2022-11-13 PROCEDURE — 81245 FLT3 GENE: CPT

## 2022-11-13 PROCEDURE — 81270 JAK2 GENE: CPT

## 2022-11-13 PROCEDURE — 74177 CT ABD & PELVIS W/CONTRAST: CPT

## 2022-11-13 PROCEDURE — 80061 LIPID PANEL: CPT

## 2022-11-13 PROCEDURE — 81001 URINALYSIS AUTO W/SCOPE: CPT

## 2022-11-13 PROCEDURE — 85384 FIBRINOGEN ACTIVITY: CPT

## 2022-11-13 PROCEDURE — 87015 SPECIMEN INFECT AGNT CONCNTJ: CPT

## 2022-11-13 PROCEDURE — 85730 THROMBOPLASTIN TIME PARTIAL: CPT

## 2022-11-13 PROCEDURE — 73060 X-RAY EXAM OF HUMERUS: CPT | Mod: 26,LT

## 2022-11-13 PROCEDURE — 70491 CT SOFT TISSUE NECK W/DYE: CPT | Mod: 26,MG

## 2022-11-13 PROCEDURE — 76942 ECHO GUIDE FOR BIOPSY: CPT

## 2022-11-13 PROCEDURE — 87075 CULTR BACTERIA EXCEPT BLOOD: CPT

## 2022-11-13 PROCEDURE — 36415 COLL VENOUS BLD VENIPUNCTURE: CPT

## 2022-11-13 PROCEDURE — 83880 ASSAY OF NATRIURETIC PEPTIDE: CPT

## 2022-11-13 PROCEDURE — 80053 COMPREHEN METABOLIC PANEL: CPT

## 2022-11-13 PROCEDURE — 86618 LYME DISEASE ANTIBODY: CPT

## 2022-11-13 PROCEDURE — 86355 B CELLS TOTAL COUNT: CPT

## 2022-11-13 PROCEDURE — 84443 ASSAY THYROID STIM HORMONE: CPT

## 2022-11-13 PROCEDURE — 83036 HEMOGLOBIN GLYCOSYLATED A1C: CPT

## 2022-11-13 PROCEDURE — 86308 HETEROPHILE ANTIBODY SCREEN: CPT

## 2022-11-13 PROCEDURE — 86360 T CELL ABSOLUTE COUNT/RATIO: CPT

## 2022-11-13 PROCEDURE — 73030 X-RAY EXAM OF SHOULDER: CPT | Mod: 26,LT

## 2022-11-13 PROCEDURE — 88312 SPECIAL STAINS GROUP 1: CPT

## 2022-11-13 PROCEDURE — G1004: CPT

## 2022-11-13 PROCEDURE — 71260 CT THORAX DX C+: CPT

## 2022-11-13 PROCEDURE — 82607 VITAMIN B-12: CPT

## 2022-11-13 RX ORDER — SODIUM CHLORIDE 9 MG/ML
1000 INJECTION INTRAMUSCULAR; INTRAVENOUS; SUBCUTANEOUS ONCE
Refills: 0 | Status: COMPLETED | OUTPATIENT
Start: 2022-11-13 | End: 2022-11-13

## 2022-11-13 RX ORDER — ACETAMINOPHEN 500 MG
650 TABLET ORAL ONCE
Refills: 0 | Status: COMPLETED | OUTPATIENT
Start: 2022-11-13 | End: 2022-11-13

## 2022-11-13 RX ORDER — CEFEPIME 1 G/1
2000 INJECTION, POWDER, FOR SOLUTION INTRAMUSCULAR; INTRAVENOUS EVERY 8 HOURS
Refills: 0 | Status: DISCONTINUED | OUTPATIENT
Start: 2022-11-13 | End: 2022-11-17

## 2022-11-13 RX ORDER — CEFEPIME 1 G/1
1000 INJECTION, POWDER, FOR SOLUTION INTRAMUSCULAR; INTRAVENOUS ONCE
Refills: 0 | Status: DISCONTINUED | OUTPATIENT
Start: 2022-11-13 | End: 2022-11-13

## 2022-11-13 RX ORDER — IBUPROFEN 200 MG
600 TABLET ORAL ONCE
Refills: 0 | Status: DISCONTINUED | OUTPATIENT
Start: 2022-11-13 | End: 2022-11-17

## 2022-11-13 RX ORDER — ACYCLOVIR SODIUM 500 MG
280 VIAL (EA) INTRAVENOUS EVERY 8 HOURS
Refills: 0 | Status: DISCONTINUED | OUTPATIENT
Start: 2022-11-13 | End: 2022-11-14

## 2022-11-13 RX ORDER — L.ACIDOPH/B.ANIMALIS/B.LONGUM 15B CELL
1 CAPSULE ORAL
Qty: 0 | Refills: 0 | DISCHARGE

## 2022-11-13 RX ORDER — ACETAMINOPHEN 500 MG
650 TABLET ORAL ONCE
Refills: 0 | Status: COMPLETED | OUTPATIENT
Start: 2022-11-13 | End: 2022-11-17

## 2022-11-13 RX ORDER — ACYCLOVIR SODIUM 500 MG
280 VIAL (EA) INTRAVENOUS ONCE
Refills: 0 | Status: COMPLETED | OUTPATIENT
Start: 2022-11-13 | End: 2022-11-13

## 2022-11-13 RX ORDER — ACYCLOVIR SODIUM 500 MG
VIAL (EA) INTRAVENOUS
Refills: 0 | Status: DISCONTINUED | OUTPATIENT
Start: 2022-11-13 | End: 2022-11-14

## 2022-11-13 RX ORDER — CEFEPIME 1 G/1
1000 INJECTION, POWDER, FOR SOLUTION INTRAMUSCULAR; INTRAVENOUS ONCE
Refills: 0 | Status: COMPLETED | OUTPATIENT
Start: 2022-11-13 | End: 2022-11-13

## 2022-11-13 RX ADMIN — Medication 80 MILLIGRAM(S): at 22:37

## 2022-11-13 RX ADMIN — CEFEPIME 100 MILLIGRAM(S): 1 INJECTION, POWDER, FOR SOLUTION INTRAMUSCULAR; INTRAVENOUS at 22:43

## 2022-11-13 RX ADMIN — SODIUM CHLORIDE 1000 MILLILITER(S): 9 INJECTION INTRAMUSCULAR; INTRAVENOUS; SUBCUTANEOUS at 14:58

## 2022-11-13 RX ADMIN — Medication 105.6 MILLIGRAM(S): at 17:24

## 2022-11-13 RX ADMIN — CEFEPIME 1000 MILLIGRAM(S): 1 INJECTION, POWDER, FOR SOLUTION INTRAMUSCULAR; INTRAVENOUS at 14:59

## 2022-11-13 RX ADMIN — SODIUM CHLORIDE 1000 MILLILITER(S): 9 INJECTION INTRAMUSCULAR; INTRAVENOUS; SUBCUTANEOUS at 11:46

## 2022-11-13 RX ADMIN — Medication 650 MILLIGRAM(S): at 12:21

## 2022-11-13 NOTE — ED STATDOCS - PHYSICAL EXAMINATION
Constitutional: NAD, well appearing  HEENT: no rhinorrhea, PERRL, no oropharyngeal erythema or exudates, midline uvula.  TMs clear. minor swelling on SCM, mildly edematous L tonsil  CVS:  RRR, no m/r/g  Resp:  CTAB  GI: soft, ntnd  MSK:  no restriction to rom, full ROM to all extremities  Neuro:  A&Ox3, 5/5 strength to all extremities,  SILT to all extremities  Skin: no rash  psych: clear thought content  Heme/lymph:  No LAD

## 2022-11-13 NOTE — ED ADULT TRIAGE NOTE - CHIEF COMPLAINT QUOTE
lump to right lateral neck present x1 month, had imaging performed at . evaluated by PCP who prescribed abx. having fevers of 102/103 x1 week with decreased PO intake. + fatigue and night sweats.

## 2022-11-13 NOTE — ED STATDOCS - NS ED ROS FT
Constitutional: nad, well appearing, +pain  HEENT:  no nasal congestion, eye drainage or ear pain.  +neck swelling and apin  CVS:  no cp  Resp:  No sob, no cough  GI:  no abdominal pain, no nausea or vomiting  :  no dysuria  MSK: no joint pain or limited ROM  Skin: no rash  Neuro: no change in mental status or level of consciousness  Heme/lymph: no bleeding

## 2022-11-13 NOTE — ED STATDOCS - OBJECTIVE STATEMENT
21 y/o F with no pertinent PMHx presents to the ED c/o neck swelling and pain x 1 month. States having fevers, chills, and night sweats. Pt has been on Amoxicillin, Clindamycin, and Cephalexin, states the Clindamycin did help a little. Denies sick contacts to tuberculosis. Last took motrin at 12 AM. Denies hemoptysis, trauma, itching, or difficulty swallowing. No recent travels. 23 y/o F with no pertinent PMHx presents to the ED c/o neck swelling and pain x 1 month. States having fevers, chills, and night sweats. Pt has been on Amoxicillin, Clindamycin, and Cephalexin, states the Clindamycin did help a little. Denies sick contacts to tuberculosis. Last took motrin at 12 AM. pt was here on 11/11 was d/c with no remarkable findings on bedside US. Denies hemoptysis, trauma, itching, or difficulty swallowing. No recent travels. pt has a fever of 101.2 F in ED.

## 2022-11-13 NOTE — ED STATDOCS - PROGRESS NOTE DETAILS
23 y/o F with no PMH presents with left sided neck pain/swelling x 1 month. Pt reports associated fever, chills. Has been on multiple antibiotics including amoxicillin, clindamycin, currently on keflex. No known sick contacts. Pt was in ED on 11/11 and discharged home. Denies hemoptysis, trauma, neck stiffness, numbness/tingling in extremities, rash. PE: Well appearing. HEENT: PERRLA, EOMI. No oropharyngeal erythema, edema, tonsilar enlargement/exudates. +left sided anterior cervical lymphadenopathy. Cardiac: s1s2, RRR. Lungs; CTAB. Abdomen: NBS x4, soft, nontender. A/P; ?abscess. Plan for labs, US neck, will consider CT, reassess. - Christophe Tenorio PA-C WBC 1.1. Noted left sided lymphadenopathy on US. Will add lyme, babesia, HIV, mono screen, blood cultures, lactate, CT neck soft tissue, reassess. - Christophe Tenorio PA-C Pt with no known FMH of [place]. Findings reviewed with patient and Dr. Hudson. Expressed concern for malignancy with patient. She reports chronic left shoulder pain, which has been evaluated by orthopedics. Unsure if she's had imaging. XRs in ED unremarkable. Will start cefepime, admission for neutropenic fever. Oncology paged. - Christophe Tenorio PA-C

## 2022-11-13 NOTE — ED STATDOCS - WR ORDER NAME 2
DISCHARGE INSTRUCTIONS FOR [facility]:    Tell all your healthcare providers that you are on this medication. Have your blood and urine checked as instructed by your doctor. Please notify your doctor if you have have signs of high blood sugar like confusion, feeling sleepy, increased thirst, frequent urination or breath that smells like fruit. Avoid sun, sun lamps, and tanning beds. Use sunscreen and clothing to protect your skin and wear sunglasses to protect your eyes. Your chances of infection are increased. Wash hands frequently and stay away from people with colds. Roport these signs to your doctor:  Weakness  Confusion  Muscle pain  Blood in the urine, or pain when passing urine  Weight loss  Night sweats  Swollen glands    Go to the Emergency Room with sudden onset of Shortness of breath or chest pains. Thank you for choosing [facility] for your care. It is our pleasure to serve you.        Outpatient Infusion Unit   [phone]    8AM-5PM Xray Shoulder 2 Views, Left

## 2022-11-13 NOTE — H&P ADULT - HISTORY OF PRESENT ILLNESS
21 [facility] month fevers and L neck swelling. She reports B symptoms including L neck swelling, fevers worse at night, aching, white plaquing of tongue, mild malaise. She reports 1 month ago she had what she thought was an ear infection and then developed the swelling in her neck, followed by fevers after the swelling occurred. She says that she has been prescribed several antibiotics that have not seemed to make a difference and came to the ED when the neck felt more swollen and painful despite recent change of antibiotic. She denies any FH of lymphomas, interaction with sick individuals, or any remote travel within the past 5 years. ROS neg for sob, cp, n/v/d but she has restriction of movement due to pain from L side of neck. In ED she was given cefepime and an US then a CT of her neck were performed.     Vitals:  T(C): 38.4 (11-13-22 @ 11:19), Max: 38.4 (11-13-22 @ 11:19)  HR: 108 (11-13-22 @ 11:19) (108 - 108)  BP: 108/76 (11-13-22 @ 11:19) (108/76 - 108/76)  RR: 18 (11-13-22 @ 11:19) (18 - 18)  SpO2: 97% (11-13-22 @ 11:19) (97% - 97%)  Wt(kg): --  I&O's Summary    Height (cm): 165.1 (11-13 @ 09:58), 165.1 (11-11 @ 14:10)  Weight (kg): 56.2 (11-13 @ 09:58), 57.2 (11-11 @ 14:10)  BMI (kg/m2): 20.6 (11-13 @ 09:58), 21 (11-11 @ 14:10)    PHYSICAL EXAM:  Appearance: Comfortable. No acute distress  HEENT:  Head and neck: Atraumatic. Normocephalic. B/L SCM swelling noted, less unilateral than expected from CTA.  Normal oral mucosa apart from white coat on tongue which is diffuse and thin, not foul smelling. PERRL, Neck has tenderness throughout, in particular L SCM but also present R side. No JVD,   Neurologic: A & O x 3, no focal deficits. EOMI , Cranial nerves are intact.  Cardiovascular: Normal S1 S2, No murmur, rubs/gallops. No JVD, No edema  Respiratory: Lungs clear to auscultation  Gastrointestinal:  Soft, Non-tender, + BS  Lower Extremities: No edema  Psychiatry: Patient is calm. No agitation. Mood & affect appropriate  Skin: No rashes/ ecchymoses/cyanosis/ulcers visualized on the face, hands or feet.    CURRENT MEDICATIONS:    methylPREDNISolone sodium succinate Injectable      LABS:	 	                              12.8   1.10  )-----------( 72       ( 13 Nov 2022 11:47 )             38.3     11-13    136  |  103  |  8   ----------------------------<  104<H>  3.9   |  28  |  0.74    Ca    8.5      13 Nov 2022 11:47    TPro  7.6  /  Alb  3.3  /  TBili  0.6  /  DBili  x   /  AST  36  /  ALT  29  /  AlkPhos  67  11-13  < from: CT Neck Soft Tissue w/ IV Cont (11.13.22 @ 12:58) >  FINDINGS:  Deep to the left sternocleidomastoid muscle there are 2 enhancing mass   lesions (sagittal 602-101). The more superiorly located mass lesion   measures 2.1 cm in height x 1.6 cm in AP diameter x 1.5 cm in width, with   peripheral enhancement and central decreased attenuation, possibly   representing central necrosis. The more inferiorly located mass lesion   measures approximately 1.5 cm in height  x 1.1 cm in AP diameter x 1.8 cm   in width. Asymmetric prominence of the left submandibular gland.    Asymmetricprominence of the left tonsillar pillar at the level the   oropharynx, without a discrete peritonsillar abscess collection.    Airway is patent.    PAROTID GLANDS:  Unremarkable.    THYROID GLAND:  Unremarkable.  VISUALIZED PARANASAL SINUSES:  Unremarkable.  VISUALIZED TYMPANOMASTOID CAVITIES: Unremarkable.  BONES:  Unremarkable.  MISCELLANEOUS:  Unremarkable.      IMPRESSION:  1. 2 enhancing mass lesions are appreciated deep to the left   sternocleidomastoid muscle, concerning for pathologic appearing level III   lymph nodes. While an infectious etiology is within the differential   diagnosis, findings are also concerning for the possibility of a   neoplastic process. Further assessment is advised. Consider soft tissue   sampling.  2. Asymmetric prominence of the left tonsillar pillar without evidence of   a peritonsillar abscess collection.  3. Asymmetric prominence of the left submandibular gland. As the right   submandibular gland is not clearly visualized, it is unclear if this   finding is inflammatory in etiology, congenital or postsurgical.   Correlate with patient history.    Findings were communicated by Dr. Behr Ventura to MICHELLE Tenorio in the   emergency department at approximately 1:30 PM on 11/13/2022.    < end of copied text >  < from: US Head + Neck Soft Tissue (11.13.22 @ 12:18) >  FINDINGS:  Right Lobe: 4.5 cm x  1.5 cm x 0.6 cm. Homogeneous echogenicity. No   nodule.    Left Lobe: 4.3 cm x 1.1 cm x 0.8 cm. Homogeneous echogenicity. No nodule.    Isthmus: 7 cm. No nodule.    CervicalLymph Nodes: Several enlarged lymph nodes with thickened cortex   in the left anterior cervical chain. Reference nodes as follows:  Level 3 lymph node measures 2.5 x 1.4 cm  Level 4 lymph node measures 1.5 x 1.1 cm.    IMPRESSION:    Lymph node enlargement left levels 3 and 4.    Normal appearance of the thyroid gland.      < end of copied text >  < from: Xray Chest 2 Views PA/Lat (11.11.22 @ 15:05) >  PA lateral.    Heart and mediastinum normal.  Lungs clear.  Costophrenic angles sharp   bilaterally.    IMPRESSION: Normal chest    < end of copied text >

## 2022-11-13 NOTE — ED ADULT NURSE NOTE - OBJECTIVE STATEMENT
Pt to Ed with c/o lump to right lateral neck present x1 month, had imaging performed at . evaluated by PCP who prescribed abx. having fevers of 102/103 x1 week with decreased PO intake. + fatigue and night sweats. Pt sent to Ct, awaiting tylenol Po for pain

## 2022-11-13 NOTE — PATIENT PROFILE ADULT - FUNCTIONAL ASSESSMENT - BASIC MOBILITY 5.
Psychiatric Evaluation - [facility] 47 y o  female MRN: [id number]  Unit/Bed#: -01 Encounter: [id number]    

## 2022-11-13 NOTE — ED STATDOCS - CLINICAL SUMMARY MEDICAL DECISION MAKING FREE TEXT BOX
Will evaluate for lymphadenitis, lymphadenopathy, likely abscess. Dispo pending labs, imaging, and reassessment. Will evaluate for lymphadenitis, lymphadenopathy,  less likely abscess. Dispo pending labs, imaging, and reassessment.

## 2022-11-13 NOTE — H&P ADULT - ASSESSMENT
23 [facility] fevers and L neck swelling. She reports B symptoms including L neck swelling, fevers worse at night, aching, white plaquing of tongue, mild malaise. She reports 1 month ago she had what she thought was an ear infection and then developed the swelling in her neck, followed by fevers after the swelling occurred. She says that she has been prescribed several antibiotics that have not seemed to make a difference and came to the ED when the neck felt more swollen and painful despite recent change of antibiotic. She denies any FH of lymphomas, interaction with sick individuals, or any remote travel within the past 5 years. ROS neg for sob, cp, n/v/d but she has restriction of movement due to pain from L side of neck. In ED she was given cefepime and an US then a CT of her neck were performed.     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   Given above clinical hx suspect viral/lymphoid process as opposed to bacterial infectious and will empirically continue cefepime for now however the concern is for EBV related sequelae.   If worsening of symptoms, discontinue steroids immediately or if ANC drops below 500 stop acyclovir.  Heme/onc, ID evaluation requested  Tylenol PRN  Follow up serum studies, EBV testing, HIV    Bicytopenia  Viral vs lymphoma induced  Steroids and acyclovir  Check CBC with differential daily, if ANC <500 stop antiviral  Consultation with heme/onc and ID.    VTE ppx SCD

## 2022-11-14 LAB
A1C WITH ESTIMATED AVERAGE GLUCOSE RESULT: 5.3 % — SIGNIFICANT CHANGE UP (ref 4–5.6)
ALBUMIN SERPL ELPH-MCNC: 2.9 G/DL — LOW (ref 3.3–5)
ALP SERPL-CCNC: 56 U/L — SIGNIFICANT CHANGE UP (ref 40–120)
ALT FLD-CCNC: 25 U/L — SIGNIFICANT CHANGE UP (ref 12–78)
ANION GAP SERPL CALC-SCNC: 4 MMOL/L — LOW (ref 5–17)
AST SERPL-CCNC: 28 U/L — SIGNIFICANT CHANGE UP (ref 15–37)
B BURGDOR IGG+IGM SER QL IB: SIGNIFICANT CHANGE UP
BASOPHILS # BLD AUTO: 0 K/UL — SIGNIFICANT CHANGE UP (ref 0–0.2)
BASOPHILS NFR BLD AUTO: 0 % — SIGNIFICANT CHANGE UP (ref 0–2)
BILIRUB SERPL-MCNC: 0.4 MG/DL — SIGNIFICANT CHANGE UP (ref 0.2–1.2)
BUN SERPL-MCNC: 6 MG/DL — LOW (ref 7–23)
CALCIUM SERPL-MCNC: 8.4 MG/DL — LOW (ref 8.5–10.1)
CHLORIDE SERPL-SCNC: 107 MMOL/L — SIGNIFICANT CHANGE UP (ref 96–108)
CHOLEST SERPL-MCNC: 126 MG/DL — SIGNIFICANT CHANGE UP
CK SERPL-CCNC: 27 U/L — SIGNIFICANT CHANGE UP (ref 26–192)
CO2 SERPL-SCNC: 27 MMOL/L — SIGNIFICANT CHANGE UP (ref 22–31)
CREAT SERPL-MCNC: 0.65 MG/DL — SIGNIFICANT CHANGE UP (ref 0.5–1.3)
CULTURE RESULTS: SIGNIFICANT CHANGE UP
EGFR: 127 ML/MIN/1.73M2 — SIGNIFICANT CHANGE UP
EOSINOPHIL # BLD AUTO: 0 K/UL — SIGNIFICANT CHANGE UP (ref 0–0.5)
EOSINOPHIL NFR BLD AUTO: 0 % — SIGNIFICANT CHANGE UP (ref 0–6)
ERYTHROCYTE [SEDIMENTATION RATE] IN BLOOD: 13 MM/HR — SIGNIFICANT CHANGE UP (ref 0–15)
ESTIMATED AVERAGE GLUCOSE: 105 MG/DL — SIGNIFICANT CHANGE UP (ref 68–114)
FERRITIN SERPL-MCNC: 213 NG/ML — HIGH (ref 15–150)
FIBRINOGEN PPP-MCNC: 480 MG/DL — SIGNIFICANT CHANGE UP (ref 330–520)
GLUCOSE SERPL-MCNC: 268 MG/DL — HIGH (ref 70–99)
HCT VFR BLD CALC: 35 % — SIGNIFICANT CHANGE UP (ref 34.5–45)
HDLC SERPL-MCNC: 33 MG/DL — LOW
HGB BLD-MCNC: 11.5 G/DL — SIGNIFICANT CHANGE UP (ref 11.5–15.5)
HIV 1+2 AB+HIV1 P24 AG SERPL QL IA: SIGNIFICANT CHANGE UP
IMM GRANULOCYTES NFR BLD AUTO: 0 % — SIGNIFICANT CHANGE UP (ref 0–0.9)
IRON SATN MFR SERPL: 15 % — SIGNIFICANT CHANGE UP (ref 14–50)
IRON SATN MFR SERPL: 40 UG/DL — SIGNIFICANT CHANGE UP (ref 30–160)
LDH SERPL L TO P-CCNC: 353 U/L — HIGH (ref 84–241)
LIPID PNL WITH DIRECT LDL SERPL: 78 MG/DL — SIGNIFICANT CHANGE UP
LYMPHOCYTES # BLD AUTO: 0.22 K/UL — LOW (ref 1–3.3)
LYMPHOCYTES # BLD AUTO: 45.8 % — HIGH (ref 13–44)
MAGNESIUM SERPL-MCNC: 2.1 MG/DL — SIGNIFICANT CHANGE UP (ref 1.6–2.6)
MCHC RBC-ENTMCNC: 26.4 PG — LOW (ref 27–34)
MCHC RBC-ENTMCNC: 32.9 GM/DL — SIGNIFICANT CHANGE UP (ref 32–36)
MCV RBC AUTO: 80.3 FL — SIGNIFICANT CHANGE UP (ref 80–100)
MONOCYTES # BLD AUTO: 0.02 K/UL — SIGNIFICANT CHANGE UP (ref 0–0.9)
MONOCYTES NFR BLD AUTO: 4.2 % — SIGNIFICANT CHANGE UP (ref 2–14)
NEUTROPHILS # BLD AUTO: 0.24 K/UL — LOW (ref 1.8–7.4)
NEUTROPHILS NFR BLD AUTO: 50 % — SIGNIFICANT CHANGE UP (ref 43–77)
NON HDL CHOLESTEROL: 92 MG/DL — SIGNIFICANT CHANGE UP
NT-PROBNP SERPL-SCNC: 327 PG/ML — HIGH (ref 0–125)
PHOSPHATE SERPL-MCNC: 4.5 MG/DL — SIGNIFICANT CHANGE UP (ref 2.5–4.5)
PLATELET # BLD AUTO: 76 K/UL — LOW (ref 150–400)
POTASSIUM SERPL-MCNC: 4.2 MMOL/L — SIGNIFICANT CHANGE UP (ref 3.5–5.3)
POTASSIUM SERPL-SCNC: 4.2 MMOL/L — SIGNIFICANT CHANGE UP (ref 3.5–5.3)
PROT SERPL-MCNC: 6.8 GM/DL — SIGNIFICANT CHANGE UP (ref 6–8.3)
RBC # BLD: 4.36 M/UL — SIGNIFICANT CHANGE UP (ref 3.8–5.2)
RBC # FLD: 12.1 % — SIGNIFICANT CHANGE UP (ref 10.3–14.5)
SODIUM SERPL-SCNC: 138 MMOL/L — SIGNIFICANT CHANGE UP (ref 135–145)
SPECIMEN SOURCE: SIGNIFICANT CHANGE UP
TIBC SERPL-MCNC: 271 UG/DL — SIGNIFICANT CHANGE UP (ref 220–430)
TRIGL SERPL-MCNC: 72 MG/DL — SIGNIFICANT CHANGE UP
TSH SERPL-MCNC: 0.42 UU/ML — SIGNIFICANT CHANGE UP (ref 0.34–4.82)
UIBC SERPL-MCNC: 231 UG/DL — SIGNIFICANT CHANGE UP (ref 110–370)
URATE SERPL-MCNC: 2.7 MG/DL — SIGNIFICANT CHANGE UP (ref 2.5–7)
VIT B12 SERPL-MCNC: >2000 PG/ML — HIGH (ref 232–1245)
WBC # BLD: 0.48 K/UL — CRITICAL LOW (ref 3.8–10.5)
WBC # FLD AUTO: 0.48 K/UL — CRITICAL LOW (ref 3.8–10.5)

## 2022-11-14 PROCEDURE — 88189 FLOWCYTOMETRY/READ 16 & >: CPT

## 2022-11-14 PROCEDURE — 99232 SBSQ HOSP IP/OBS MODERATE 35: CPT

## 2022-11-14 PROCEDURE — 99223 1ST HOSP IP/OBS HIGH 75: CPT | Mod: 1L

## 2022-11-14 RX ORDER — SODIUM CHLORIDE 9 MG/ML
1000 INJECTION, SOLUTION INTRAVENOUS
Refills: 0 | Status: DISCONTINUED | OUTPATIENT
Start: 2022-11-14 | End: 2022-11-17

## 2022-11-14 RX ADMIN — Medication 80 MILLIGRAM(S): at 06:11

## 2022-11-14 RX ADMIN — Medication 105.6 MILLIGRAM(S): at 00:04

## 2022-11-14 RX ADMIN — CEFEPIME 100 MILLIGRAM(S): 1 INJECTION, POWDER, FOR SOLUTION INTRAMUSCULAR; INTRAVENOUS at 05:00

## 2022-11-14 RX ADMIN — CEFEPIME 100 MILLIGRAM(S): 1 INJECTION, POWDER, FOR SOLUTION INTRAMUSCULAR; INTRAVENOUS at 14:38

## 2022-11-14 RX ADMIN — CEFEPIME 100 MILLIGRAM(S): 1 INJECTION, POWDER, FOR SOLUTION INTRAMUSCULAR; INTRAVENOUS at 22:00

## 2022-11-14 RX ADMIN — Medication 105.6 MILLIGRAM(S): at 06:10

## 2022-11-14 NOTE — DIETITIAN INITIAL EVALUATION ADULT - ADD RECOMMEND
1) Continue with current regular diet and encourage protein enriched foods with all meals. 2) Monitor weights and track trends 3) MVI w/ minerals daily to meet RDI's 4) Consider adding thiamine 100 mg daily 2/2 poor PO intake/ malnutrition 5) Obtain vitamin D 25OH level to assess nutriture **Will continue to monitor and follow up prn**

## 2022-11-14 NOTE — DIETITIAN INITIAL EVALUATION ADULT - OTHER INFO
23 [facility] with 1 month fevers and L neck swelling. She reports B symptoms including L neck swelling, fevers worse at night, aching, white plaquing of tongue, mild malaise. She reports 1 month ago she had what she thought was an ear infection and then developed the swelling in her neck, followed by fevers after the swelling occurred. She says that she has been prescribed several antibiotics that have not seemed to make a difference and came to the ED when the neck felt more swollen and painful despite recent change of antibiotic. She denies any FH of lymphomas, interaction with sick individuals, or any remote travel within the past 5 years. ROS neg for sob, cp, n/v/d but she has restriction of movement due to pain from L side of neck. In ED she was given cefepime and an US then a CT of her neck were performed.   NFPE- moderate muscle/fat wasting. Current observed breakfast tray indicating 50% consumption. Bed scale weight: 123# indicating a significant weight loss x 3 days of 2.4%. (UBW stated by patient 126#). Pt willing to trial Ensure High Protein 8oz po x 3 daily (in between meals) to optimize nutrition. Criteria met for severe malnutrition. See below for recommendations.

## 2022-11-14 NOTE — PROGRESS NOTE ADULT - ASSESSMENT
Neutropenic Fever   CT with enlarged necrotic lymph nodes - left neck   No evidence of acute infection apart from fever, and notably not responding to PO antibiotics as outpt.   Given above clinical hx suspect viral/lymphoid process as opposed to bacterial infectious and will empirically continue cefepime for now however the concern is for EBV related sequelae.   - DC IV steroids; DC acyclovir   Heme/onc, ID evaluation requested  Tylenol PRN  Follow up serum studies, EBV testing, HIV    Bicytopenia  Viral vs lymphoma induced  Steroids and acyclovir  Check CBC with differential daily, if ANC <500 stop antiviral  Consultation with heme/onc and ID.  spoke to IR and plan for cervical LN biopsy tmr under [place]    IVFs     VTE ppx SCD

## 2022-11-14 NOTE — CONSULT NOTE ADULT - SUBJECTIVE AND OBJECTIVE BOX
Patient is a 22y old  Female who presents with a chief complaint of L neck swelling     HPI:  23 y/o Female from [facility] with no significant PMH was admitted on  for fever. She originally developed fever one month PTA associated with L neck swelling. She reports B symptoms including L neck swelling, fevers worse at night, aching, white plaquing of tongue, mild malaise. She reports 1 month ago she had what she thought was an ear infection and then developed the swelling in her neck, followed by fevers after the swelling occurred. She says that she has been prescribed several antibiotics that have not seemed to make a difference and came to the ED when the neck felt more swollen and painful despite recent change of antibiotic. In ED she was given cefepime.     PMH: as above  PSH: as above  Meds: per reconciliation sheet, noted below  MEDICATIONS  (STANDING):  acyclovir IVPB      acyclovir IVPB 280 milliGRAM(s) IV Intermittent every 8 hours  cefepime   IVPB 2000 milliGRAM(s) IV Intermittent every 8 hours  methylPREDNISolone sodium succinate Injectable 80 milliGRAM(s) IV Push three times a day    MEDICATIONS  (PRN):  acetaminophen     Tablet .. 650 milliGRAM(s) Oral once PRN Temp greater or equal to 38C (100.4F), Mild Pain (1 - 3)  ibuprofen  Tablet. 600 milliGRAM(s) Oral once PRN Temp greater or equal to 38C (100.4F), Mild Pain (1 - 3)    Allergies    No Known Allergies    Intolerances      Social: no smoking, no alcohol, no illegal drugs; no recent travel, no exposure to TB  FAMILY HISTORY:    no history of premature cardiovascular disease in first degree relatives    ROS: the patient denies HA, no seizures, no dizziness, no sore throat, no nasal congestion, no blurry vision, no CP, no palpitations, no SOB, no cough, no abdominal pain, no diarrhea, no N/V, no dysuria, no leg pain, no claudication, no rash, no joint aches, no rectal pain or bleeding, no night sweats; has left neck swelling  All other systems reviewed and are negative    Vital Signs Last 24 Hrs  T(C): 36.7 (2022 08:26), Max: 38.4 (2022 11:19)  T(F): 98 (2022 08:26), Max: 101.2 (2022 11:19)  HR: 80 (:) (80 - 108)  BP: 96/60 (:) (96/60 - 112/61)  BP(mean): 69 (2022 19:56) (69 - 86)  RR: 16 (:) (16 - 18)  SpO2: 100% (:) (97% - 100%)    Parameters below as of   Patient On (Oxygen Delivery Method): room air      Daily Height in cm: 165.1 (2022 20:34)    Daily     PE:    Constitutional:  No acute distress  HEENT: NC/AT, EOMI, PERRLA, conjunctivae clear; ears and nose atraumatic; pharynx benign  Neck: supple; thyroid not palpable; left neck lymphadenopathy  Back: no tenderness  Respiratory: respiratory effort normal; clear to auscultation  Cardiovascular: S1S2 regular, no murmurs  Abdomen: soft, not tender, not distended, positive BS; no liver or spleen organomegaly  Genitourinary: no suprapubic tenderness  Lymphatic: no LN palpable  Musculoskeletal: no muscle tenderness, no joint swelling or tenderness  Extremities: no pedal edema  Neurological/ Psychiatric: AxOx3, judgement and insight normal; moving all extremities  Skin: no rashes; no palpable lesions    Labs: all available labs reviewed                        11.5   0.48  )-----------( 76       ( 2022 07:18 )             35.0     -14    138  |  107  |  6<L>  ----------------------------<  268<H>  4.2   |  27  |  0.65    Ca    8.4<L>      2022 07:18  Phos  4.5       Mg     2.1         TPro  6.8  /  Alb  2.9<L>  /  TBili  0.4  /  DBili  x   /  AST  28  /  ALT  25  /  AlkPhos  56  -14     LIVER FUNCTIONS - ( 2022 07:18 )  Alb: 2.9 g/dL / Pro: 6.8 gm/dL / ALK PHOS: 56 U/L / ALT: 25 U/L / AST: 28 U/L / GGT: x           Urinalysis Basic - ( 2022 14:44 )    Color: Yellow / Appearance: Clear / S.005 / pH: x  Gluc: x / Ketone: Moderate  / Bili: Negative / Urobili: 4   Blood: x / Protein: 30 mg/dL / Nitrite: Negative   Leuk Esterase: Trace / RBC: 0-2 /HPF / WBC 0-2   Sq Epi: x / Non Sq Epi: Occasional / Bacteria: Occasional        Culture Results:   Babesia microti PCR  Results: NOT detected      ( @ 14:44)  NotDetec  ( @ 15:03)  NotDete    Radiology: all available radiological tests reviewed    < from: CT Neck Soft Tissue w/ IV Cont (22 @ 12:58) >  1. 2 enhancing mass lesions are appreciated deep to the left   sternocleidomastoid muscle, concerning for pathologic appearing level III   lymph nodes. While an infectious etiology is within the differential   diagnosis, findings are also concerning for the possibility of a   neoplastic process. Further assessment is advised. Consider soft tissue sampling.  2. Asymmetric prominence of the left tonsillar pillar without evidence of   a peritonsillar abscess collection.  3. Asymmetric prominence of the left submandibular gland. As the right   submandibular gland is not clearly visualized, it is unclear if this   finding is inflammatory in etiology, congenital or postsurgical.   Correlate with patient history.    < end of copied text >      Advanced directives addressed: full resuscitation
HPI:  21 [facility] with 1 month fevers and L neck swelling. She reports B symptoms including L neck swelling, fevers worse at night, aching, white plaquing of tongue, mild malaise. She reports 1 month ago she had what she thought was an ear infection and then developed the swelling in her neck, followed by fevers after the swelling occurred. She says that she has been prescribed several antibiotics that have not seemed to make a difference and came to the ED when the neck felt more swollen and painful despite recent change of antibiotic. She denies any FH of lymphomas, interaction with sick individuals, or any remote travel within the past 5 years. ROS neg for sob, cp, n/v/d but she has restriction of movement due to pain from L side of neck. In ED she was given cefepime and an US then a CT of her neck were performed.     Vitals:  T(C): 38.4 (22 @ 11:19), Max: 38.4 (22 @ 11:19)  HR: 108 (22 @ 11:19) (108 - 108)  BP: 108/76 (22 @ 11:19) (108/76 - 108/76)  RR: 18 (22 @ 11:19) (18 - 18)  SpO2: 97% (22 @ 11:19) (97% - 97%)  Wt(kg): --  I&O's Summary    Height (cm): 165.1 ( @ 09:58), 165.1 ( @ 14:10)  Weight (kg): 56.2 ( @ 09:58), 57.2 ( @ 14:10)  BMI (kg/m2): 20.6 ( @ 09:58), 21 ( @ 14:10)    PHYSICAL EXAM:  Appearance: Comfortable. No acute distress  HEENT:  Head and neck: Atraumatic. Normocephalic. B/L SCM swelling noted, less unilateral than expected from CTA.  Normal oral mucosa apart from white coat on tongue which is diffuse and thin, not foul smelling. PERRL, Neck has tenderness throughout, in particular L SCM but also present R side. No JVD,   Neurologic: A & O x 3, no focal deficits. EOMI , Cranial nerves are intact.  Cardiovascular: Normal S1 S2, No murmur, rubs/gallops. No JVD, No edema  Respiratory: Lungs clear to auscultation  Gastrointestinal:  Soft, Non-tender, + BS  Lower Extremities: No edema  Psychiatry: Patient is calm. No agitation. Mood & affect appropriate  Skin: No rashes/ ecchymoses/cyanosis/ulcers visualized on the face, hands or feet.    CURRENT MEDICATIONS:    methylPREDNISolone sodium succinate Injectable      LABS:	 	                              12.8   1.10  )-----------( 72       ( 2022 11:47 )             38.3         136  |  103  |  8   ----------------------------<  104<H>  3.9   |  28  |  0.74    Ca    8.5      2022 11:47    TPro  7.6  /  Alb  3.3  /  TBili  0.6  /  DBili  x   /  AST  36  /  ALT  29  /  AlkPhos  67    < from: CT Neck Soft Tissue w/ IV Cont (22 @ 12:58) >  FINDINGS:  Deep to the left sternocleidomastoid muscle there are 2 enhancing mass   lesions (sagittal 602-101). The more superiorly located mass lesion   measures 2.1 cm in height x 1.6 cm in AP diameter x 1.5 cm in width, with   peripheral enhancement and central decreased attenuation, possibly   representing central necrosis. The more inferiorly located mass lesion   measures approximately 1.5 cm in height  x 1.1 cm in AP diameter x 1.8 cm   in width. Asymmetric prominence of the left submandibular gland.    Asymmetricprominence of the left tonsillar pillar at the level the   oropharynx, without a discrete peritonsillar abscess collection.    Airway is patent.    PAROTID GLANDS:  Unremarkable.    THYROID GLAND:  Unremarkable.  VISUALIZED PARANASAL SINUSES:  Unremarkable.  VISUALIZED TYMPANOMASTOID CAVITIES: Unremarkable.  BONES:  Unremarkable.  MISCELLANEOUS:  Unremarkable.      IMPRESSION:  1. 2 enhancing mass lesions are appreciated deep to the left   sternocleidomastoid muscle, concerning for pathologic appearing level III   lymph nodes. While an infectious etiology is within the differential   diagnosis, findings are also concerning for the possibility of a   neoplastic process. Further assessment is advised. Consider soft tissue   sampling.  2. Asymmetric prominence of the left tonsillar pillar without evidence of   a peritonsillar abscess collection.  3. Asymmetric prominence of the left submandibular gland. As the right   submandibular gland is not clearly visualized, it is unclear if this   finding is inflammatory in etiology, congenital or postsurgical.   Correlate with patient history.    Findings were communicated by Dr. Behr Ventura to MICHELLE Tenorio in the   emergency department at approximately 1:30 PM on 2022.    < end of copied text >  < from: US Head + Neck Soft Tissue (22 @ 12:18) >  FINDINGS:  Right Lobe: 4.5 cm x  1.5 cm x 0.6 cm. Homogeneous echogenicity. No   nodule.    Left Lobe: 4.3 cm x 1.1 cm x 0.8 cm. Homogeneous echogenicity. No nodule.    Isthmus: 7 cm. No nodule.    CervicalLymph Nodes: Several enlarged lymph nodes with thickened cortex   in the left anterior cervical chain. Reference nodes as follows:  Level 3 lymph node measures 2.5 x 1.4 cm  Level 4 lymph node measures 1.5 x 1.1 cm.    IMPRESSION:    Lymph node enlargement left levels 3 and 4.    Normal appearance of the thyroid gland.      < end of copied text >  < from: Xray Chest 2 Views PA/Lat (22 @ 15:05) >  PA lateral.    Heart and mediastinum normal.  Lungs clear.  Costophrenic angles sharp   bilaterally.    IMPRESSION: Normal chest    < end of copied text >   (2022 16:12)      PAST MEDICAL & SURGICAL HISTORY:  No pertinent past medical history          MEDICATIONS  (STANDING):  acyclovir IVPB      acyclovir IVPB 280 milliGRAM(s) IV Intermittent every 8 hours  cefepime   IVPB 2000 milliGRAM(s) IV Intermittent every 8 hours  methylPREDNISolone sodium succinate Injectable 80 milliGRAM(s) IV Push three times a day    MEDICATIONS  (PRN):  acetaminophen     Tablet .. 650 milliGRAM(s) Oral once PRN Temp greater or equal to 38C (100.4F), Mild Pain (1 - 3)  ibuprofen  Tablet. 600 milliGRAM(s) Oral once PRN Temp greater or equal to 38C (100.4F), Mild Pain (1 - 3)      Allergies    No Known Allergies    Intolerances        FAMILY HISTORY:      Review of Systems    Constitutional, Eyes, ENT, Cardiovascular, Respiratory, Gastrointestinal, Genitourinary, Musculoskeletal, Integumentary, Neurological, Psychiatric, Endocrine, Heme/Lymph and Allergic/Immunologic review of systems are otherwise negative except as noted in HPI.     Vital Signs Last 24 Hrs  T(C): 36.9 (2022 05:04), Max: 38.4 (2022 11:19)  T(F): 98.4 (2022 05:04), Max: 101.2 (2022 11:19)  HR: 94 (2022 05:04) (84 - 108)  BP: 112/61 (2022 05:04) (100/59 - 112/61)  BP(mean): 69 (2022 19:56) (69 - 86)  RR: 16 (2022 05:04) (16 - 18)  SpO2: 100% (2022 05:04) (97% - 100%)    Parameters below as of 2022 05:04  Patient On (Oxygen Delivery Method): room air        Physical Exam  Eyes: PERRL, EOMI, no conjunctival infection, anicteric.   ENT: pharynx is unremarkable, moist mucus membrane, no oral lesions.   Neck: supple without JVD, no thyromegaly or masses appreciated.   Pulmonary: clear to auscultation bilaterally, no dullness, no wheezing.   Cardiac: RRR, normal S1S2, no murmurs, rubs, gallops.   Vascular: no JVD, no calf tenderness, venous stasis changes, varices.   Abdomen: normoactive bowel sounds, soft and nontender, no hepatosplenomegaly or masses appreciated.   Lymphatic: no peripheral adenopathy appreciated.   Musculoskeletal: full range of motion and no deformities appreciated.   Skin: normal appearance, no rash, nodules, vesicles, ulcers, erythema.   Neurology: grossly intact.   Psychiatric: affect appropriate.       LABS:  CBC Full  -  ( 2022 07:18 )  WBC Count : 0.48 K/uL  RBC Count : 4.36 M/uL  Hemoglobin : 11.5 g/dL  Hematocrit : 35.0 %  Platelet Count - Automated : 76 K/uL  Mean Cell Volume : 80.3 fl  Mean Cell Hemoglobin : 26.4 pg  Mean Cell Hemoglobin Concentration : 32.9 gm/dL  Auto Neutrophil # : x  Auto Lymphocyte # : x  Auto Monocyte # : x  Auto Eosinophil # : x  Auto Basophil # : x  Auto Neutrophil % : x  Auto Lymphocyte % : x  Auto Monocyte % : x  Auto Eosinophil % : x  Auto Basophil % : x        138  |  107  |  6<L>  ----------------------------<  268<H>  4.2   |  27  |  0.65    Ca    8.4<L>      2022 07:18  Phos  4.5       Mg     2.1         TPro  6.8  /  Alb  2.9<L>  /  TBili  0.4  /  DBili  x   /  AST  28  /  ALT  25  /  AlkPhos  56        Urinalysis Basic - ( 2022 14:44 )    Color: Yellow / Appearance: Clear / S.005 / pH: x  Gluc: x / Ketone: Moderate  / Bili: Negative / Urobili: 4   Blood: x / Protein: 30 mg/dL / Nitrite: Negative   Leuk Esterase: Trace / RBC: 0-2 /HPF / WBC 0-2   Sq Epi: x / Non Sq Epi: Occasional / Bacteria: Occasional        RADIOLOGY & ADDITIONAL STUDIES:

## 2022-11-14 NOTE — DIETITIAN NUTRITION RISK NOTIFICATION - ADDITIONAL COMMENTS/DIETITIAN RECOMMENDATIONS
Additional Recommendations  1) Continue with current regular diet and encourage protein enriched foods with all meals. 2) Monitor weights and track trends 3) MVI w/ minerals daily to meet RDI's 4) Consider adding thiamine 100 mg daily 2/2 poor PO intake/ malnutrition 5) Obtain vitamin D 25OH level to assess nutriture **Will continue to monitor and follow up prn**

## 2022-11-14 NOTE — CONSULT NOTE ADULT - ASSESSMENT
23 [email or web address] with 1 month fevers and L neck swelling. She reports B symptoms including L neck swelling, fevers worse at night, aching, white plaquing of tongue, mild malaise.    Plan:  -differential includes infectious, autoimmune, benign, lymphoma and other malignant disorders.  -clinical presentation of non-Hodgkin lymphoma (NHL) varies depending on the subtype of lymphoma and areas of involvement.   -common presentation of lymphoma includes lymphadenopathy, hepatosplenomegaly, fever, weight loss, and night sweats. Less common presentations include extranodal involvement, abnormal laboratory findings, and paraneoplastic syndromes.  CT:   Deep to the left sternocleidomastoid muscle there are 2 enhancing mass lesions. The more superiorly located mass lesion measures 2.1 cm in height x 1.6 cm in AP diameter x 1.5 cm in width, with peripheral enhancement and central decreased attenuation, possibly representing central necrosis. The more inferiorly located mass lesion measures approximately 1.5 cm in height  x 1.1 cm in AP diameter x 1.8 cm in width. Asymmetric prominence of the left submandibular gland. Asymmetric prominence of the left tonsillar pillar at the level the oropharynx, without a discrete peritonsillar abscess collection.  -I have reached out to ENT, Dr. Peck to review films  -will need CT CAP staging evaluation  -given cytopenias, will send flow cytometry on peripheral blood  -check CBC with diff, ESR, LDH  -consult IR for tissue biopsy for diagnosis and to determine the histologic subtype.  Will need biopsy of of most easily accessible mass pending CT CAP.  Lymph node excision, incisional biopsy, or multiple core needle biopsies needed for histology, immunophenotyping and cytogenetics  -monitor for tumor lysis syndrome with potassium, phosphorus, calcium, creatinine and uric acid and cont IVF
23 y/o Female from [facility] with no significant PMH was admitted on 11/13 for fever. She originally developed fever one month PTA associated with L neck swelling. She reports B symptoms including L neck swelling, fevers worse at night, aching, white plaquing of tongue, mild malaise. She reports 1 month ago she had what she thought was an ear infection and then developed the swelling in her neck, followed by fevers after the swelling occurred. She says that she has been prescribed several antibiotics that have not seemed to make a difference and came to the ED when the neck felt more swollen and painful despite recent change of antibiotic. In ED she was given cefepime.     1. Neutropenic fever. Cervical lymphadenopathy. Possible lymphoma/ leukemia.  -an infectious process cannot be excluded, ?scrofula  -obtain BC x 2  -start cefepime 2 gm IV q8h  -reason for abx use and side effects reviewed with patient; monitor BMP   -consider LN biopsy, culture, AFB  -old chart reviewed to assess prior cultures  -monitor temps  -f/u CBC  -supportive care  2. Other issues:   -care per medicine

## 2022-11-14 NOTE — PROGRESS NOTE ADULT - SUBJECTIVE AND OBJECTIVE BOX
CC: 23 yo F from [facility] originally with 1 month fevers and L neck swelling. She reports B symptoms including L neck swelling, fevers worse at night, aching, white plaquing of tongue, mild malaise. She reports 1 month ago she had what she thought was an ear infection and then developed the swelling in her neck, followed by fevers after the swelling occurred. She says that she has been prescribed several antibiotics that have not seemed to make a difference and came to the ED when the neck felt more swollen and painful despite recent change of antibiotic. She denies any FH of lymphomas, interaction with sick individuals, or any remote travel within the past 5 years. ROS neg for sob, cp, n/v/d but she has restriction of movement due to pain from L side of neck. In ED she was given cefepime and an US then a CT of her neck were performed.     11/14 - no cp palps sob abdo pain dysuria but reports malaise     Vital Signs Last 24 Hrs  T(F): 98 (14 Nov 2022 20:09), Max: 100.1 (14 Nov 2022 00:09)  HR: 73 (14 Nov 2022 20:09) (73 - 97)  BP: 102/73 (14 Nov 2022 20:09) (96/60 - 116/77)  RR: 16 (14 Nov 2022 20:09) (16 - 17)  SpO2: 100% (14 Nov 2022 20:09) (100% - 100%  Constitutional: NAD, awake and alert  HEENT: PERR, EOMI  Neck: Soft and supple,  No JVD  Respiratory: Breath sounds are clear bilaterally, No wheezing, rales or rhonchi  Cardiovascular: S1 and S2, regular rate and rhythm, no Murmurs  Gastrointestinal: Bowel Sounds present, soft, nontender, nondistended  Extremities: No peripheral edema  Vascular: 2+ peripheral pulses  Neurological: A/O x 3, no focal deficits  Musculoskeletal: 5/5 strength b/l upper and lower extremities  Skin: No rashes    MEDICATIONS:  MEDICATIONS  (STANDING):  cefepime   IVPB 2000 milliGRAM(s) IV Intermittent every 8 hours  sodium chloride 0.45%. 1000 milliLiter(s) (100 mL/Hr) IV Continuous <Continuous>      LABS: All Labs Reviewed:                        11.5   0.48  )-----------( 76       ( 14 Nov 2022 07:18 )             35.0     138  |  107  |  6<L>  ----------------------------<  268<H>  4.2   |  27  |  0.65    Ca    8.4<L>      14 Nov 2022 07:18  Phos  4.5     11-14  Mg     2.1     11-14    TPro  6.8  /  Alb  2.9<L>  /  TBili  0.4  /  DBili  x   /  AST  28  /  ALT  25  /  AlkPhos  56  11-14      CARDIAC MARKERS ( 14 Nov 2022 07:18 )  x     / x     / 27 U/L / x     / x            RADIOLOGY/EKG:  < from: CT Neck Soft Tissue w/ IV Cont (11.13.22 @ 12:58) >  IMPRESSION:  1. 2 enhancing mass lesions are appreciated deep to the left   sternocleidomastoid muscle, concerning for pathologic appearing level III   lymph nodes. While an infectious etiology is within the differential   diagnosis, findings are also concerning for the possibility of a   neoplastic process. Further assessment is advised. Consider soft tissue   sampling.  2. Asymmetric prominence of the left tonsillar pillar without evidence of   a peritonsillar abscess collection.  3. Asymmetric prominence of the left submandibular gland. As the right   submandibular gland is not clearly visualized, it is unclear if this   finding is inflammatory in etiology, congenital or postsurgical.   Correlate with patient history.

## 2022-11-14 NOTE — DIETITIAN INITIAL EVALUATION ADULT - PERTINENT MEDS FT
MEDICATIONS  (STANDING):  cefepime   IVPB 2000 milliGRAM(s) IV Intermittent every 8 hours    MEDICATIONS  (PRN):  acetaminophen     Tablet .. 650 milliGRAM(s) Oral once PRN Temp greater or equal to 38C (100.4F), Mild Pain (1 - 3)  ibuprofen  Tablet. 600 milliGRAM(s) Oral once PRN Temp greater or equal to 38C (100.4F), Mild Pain (1 - 3)

## 2022-11-14 NOTE — DIETITIAN INITIAL EVALUATION ADULT - PERTINENT LABORATORY DATA
11-14    138  |  107  |  6<L>  ----------------------------<  268<H>  4.2   |  27  |  0.65    Ca    8.4<L>      14 Nov 2022 07:18  Phos  4.5     11-14  Mg     2.1     11-14    TPro  6.8  /  Alb  2.9<L>  /  TBili  0.4  /  DBili  x   /  AST  28  /  ALT  25  /  AlkPhos  56  11-14  A1C with Estimated Average Glucose Result: 5.3 % (11-14-22 @ 07:18)

## 2022-11-15 ENCOUNTER — RESULT REVIEW (OUTPATIENT)
Age: 22
End: 2022-11-15

## 2022-11-15 ENCOUNTER — TRANSCRIPTION ENCOUNTER (OUTPATIENT)
Age: 22
End: 2022-11-15

## 2022-11-15 LAB
ANION GAP SERPL CALC-SCNC: 2 MMOL/L — LOW (ref 5–17)
APTT BLD: 27.7 SEC — SIGNIFICANT CHANGE UP (ref 27.5–35.5)
BASOPHILS # BLD AUTO: 0 K/UL — SIGNIFICANT CHANGE UP (ref 0–0.2)
BASOPHILS NFR BLD AUTO: 0 % — SIGNIFICANT CHANGE UP (ref 0–2)
BUN SERPL-MCNC: 9 MG/DL — SIGNIFICANT CHANGE UP (ref 7–23)
CALCIUM SERPL-MCNC: 8.1 MG/DL — LOW (ref 8.5–10.1)
CHLORIDE SERPL-SCNC: 109 MMOL/L — HIGH (ref 96–108)
CO2 SERPL-SCNC: 29 MMOL/L — SIGNIFICANT CHANGE UP (ref 22–31)
CREAT SERPL-MCNC: 0.65 MG/DL — SIGNIFICANT CHANGE UP (ref 0.5–1.3)
EGFR: 127 ML/MIN/1.73M2 — SIGNIFICANT CHANGE UP
EOSINOPHIL # BLD AUTO: 0 K/UL — SIGNIFICANT CHANGE UP (ref 0–0.5)
EOSINOPHIL NFR BLD AUTO: 0 % — SIGNIFICANT CHANGE UP (ref 0–6)
GLUCOSE SERPL-MCNC: 114 MG/DL — HIGH (ref 70–99)
HCT VFR BLD CALC: 34.6 % — SIGNIFICANT CHANGE UP (ref 34.5–45)
HETEROPH AB TITR SER AGGL: NEGATIVE — SIGNIFICANT CHANGE UP
HGB BLD-MCNC: 11.4 G/DL — LOW (ref 11.5–15.5)
IMM GRANULOCYTES NFR BLD AUTO: 0.5 % — SIGNIFICANT CHANGE UP (ref 0–0.9)
INR BLD: 1.13 RATIO — SIGNIFICANT CHANGE UP (ref 0.88–1.16)
LYMPHOCYTES # BLD AUTO: 0.56 K/UL — LOW (ref 1–3.3)
LYMPHOCYTES # BLD AUTO: 28.4 % — SIGNIFICANT CHANGE UP (ref 13–44)
MAGNESIUM SERPL-MCNC: 2 MG/DL — SIGNIFICANT CHANGE UP (ref 1.6–2.6)
MCHC RBC-ENTMCNC: 26.7 PG — LOW (ref 27–34)
MCHC RBC-ENTMCNC: 32.9 GM/DL — SIGNIFICANT CHANGE UP (ref 32–36)
MCV RBC AUTO: 81 FL — SIGNIFICANT CHANGE UP (ref 80–100)
MONOCYTES # BLD AUTO: 0.15 K/UL — SIGNIFICANT CHANGE UP (ref 0–0.9)
MONOCYTES NFR BLD AUTO: 7.6 % — SIGNIFICANT CHANGE UP (ref 2–14)
NEUTROPHILS # BLD AUTO: 1.25 K/UL — LOW (ref 1.8–7.4)
NEUTROPHILS NFR BLD AUTO: 63.5 % — SIGNIFICANT CHANGE UP (ref 43–77)
PHOSPHATE SERPL-MCNC: 2.7 MG/DL — SIGNIFICANT CHANGE UP (ref 2.5–4.5)
PLATELET # BLD AUTO: 82 K/UL — LOW (ref 150–400)
POTASSIUM SERPL-MCNC: 3.5 MMOL/L — SIGNIFICANT CHANGE UP (ref 3.5–5.3)
POTASSIUM SERPL-SCNC: 3.5 MMOL/L — SIGNIFICANT CHANGE UP (ref 3.5–5.3)
PROTHROM AB SERPL-ACNC: 13.1 SEC — SIGNIFICANT CHANGE UP (ref 10.5–13.4)
RBC # BLD: 4.27 M/UL — SIGNIFICANT CHANGE UP (ref 3.8–5.2)
RBC # FLD: 12.4 % — SIGNIFICANT CHANGE UP (ref 10.3–14.5)
SODIUM SERPL-SCNC: 140 MMOL/L — SIGNIFICANT CHANGE UP (ref 135–145)
WBC # BLD: 1.97 K/UL — LOW (ref 3.8–10.5)
WBC # FLD AUTO: 1.97 K/UL — LOW (ref 3.8–10.5)

## 2022-11-15 PROCEDURE — 74177 CT ABD & PELVIS W/CONTRAST: CPT | Mod: 26

## 2022-11-15 PROCEDURE — 76942 ECHO GUIDE FOR BIOPSY: CPT | Mod: 26

## 2022-11-15 PROCEDURE — 88305 TISSUE EXAM BY PATHOLOGIST: CPT | Mod: 26

## 2022-11-15 PROCEDURE — 71260 CT THORAX DX C+: CPT | Mod: 26

## 2022-11-15 PROCEDURE — 99232 SBSQ HOSP IP/OBS MODERATE 35: CPT

## 2022-11-15 PROCEDURE — 99232 SBSQ HOSP IP/OBS MODERATE 35: CPT | Mod: 1L

## 2022-11-15 PROCEDURE — 88312 SPECIAL STAINS GROUP 1: CPT | Mod: 26

## 2022-11-15 PROCEDURE — 38505 NEEDLE BIOPSY LYMPH NODES: CPT

## 2022-11-15 RX ORDER — ACETAMINOPHEN 500 MG
650 TABLET ORAL EVERY 6 HOURS
Refills: 0 | Status: DISCONTINUED | OUTPATIENT
Start: 2022-11-15 | End: 2022-11-17

## 2022-11-15 RX ADMIN — CEFEPIME 100 MILLIGRAM(S): 1 INJECTION, POWDER, FOR SOLUTION INTRAMUSCULAR; INTRAVENOUS at 13:31

## 2022-11-15 RX ADMIN — CEFEPIME 100 MILLIGRAM(S): 1 INJECTION, POWDER, FOR SOLUTION INTRAMUSCULAR; INTRAVENOUS at 22:32

## 2022-11-15 RX ADMIN — SODIUM CHLORIDE 100 MILLILITER(S): 9 INJECTION, SOLUTION INTRAVENOUS at 05:44

## 2022-11-15 RX ADMIN — SODIUM CHLORIDE 100 MILLILITER(S): 9 INJECTION, SOLUTION INTRAVENOUS at 06:46

## 2022-11-15 RX ADMIN — CEFEPIME 100 MILLIGRAM(S): 1 INJECTION, POWDER, FOR SOLUTION INTRAMUSCULAR; INTRAVENOUS at 06:29

## 2022-11-15 NOTE — PROGRESS NOTE ADULT - SUBJECTIVE AND OBJECTIVE BOX
HPI:  21 [facility] with 1 month fevers and L neck swelling. She reports B symptoms including L neck swelling, fevers worse at night, aching, white plaquing of tongue, mild malaise. She reports 1 month ago she had what she thought was an ear infection and then developed the swelling in her neck, followed by fevers after the swelling occurred. She says that she has been prescribed several antibiotics that have not seemed to make a difference and came to the ED when the neck felt more swollen and painful despite recent change of antibiotic. She denies any FH of lymphomas, interaction with sick individuals, or any remote travel within the past 5 years. ROS neg for sob, cp, n/v/d but she has restriction of movement due to pain from L side of neck. In ED she was given cefepime and an US then a CT of her neck were performed.     ICU Vital Signs Last 24 Hrs  T(C): 36.7 (15 Nov 2022 08:31), Max: 36.7 (2022 16:23)  T(F): 98.1 (15 Nov 2022 08:31), Max: 98.1 (2022 16:23)  HR: 77 (15 Nov 2022 08:31) (57 - 80)  BP: 95/63 (15 Nov 2022 08:31) (87/46 - 116/77)  BP(mean): --  ABP: --  ABP(mean): --  RR: 16 (15 Nov 2022 08:31) (16 - 17)  SpO2: 98% (15 Nov 2022 08:31) (98% - 100%)    O2 Parameters below as of 15 Nov 2022 08:31  Patient On (Oxygen Delivery Method): room air        Height (cm): 165.1 ( @ 09:58), 165.1 ( @ 14:10)  Weight (kg): 56.2 ( @ 09:58), 57.2 ( @ 14:10)  BMI (kg/m2): 20.6 ( @ 09:58), 21 ( @ 14:10)    PHYSICAL EXAM:  Appearance: Comfortable. No acute distress  HEENT:  Head and neck: Atraumatic. Normocephalic. B/L SCM swelling noted, less unilateral than expected from CTA.  Normal oral mucosa apart from white coat on tongue which is diffuse and thin, not foul smelling. PERRL, Neck has tenderness throughout, in particular L SCM but also present R side. No JVD,   Neurologic: A & O x 3, no focal deficits. EOMI , Cranial nerves are intact.  Cardiovascular: Normal S1 S2, No murmur, rubs/gallops. No JVD, No edema  Respiratory: Lungs clear to auscultation  Gastrointestinal:  Soft, Non-tender, + BS  Lower Extremities: No edema  Psychiatry: Patient is calm. No agitation. Mood & affect appropriate  Skin: No rashes/ ecchymoses/cyanosis/ulcers visualized on the face, hands or feet.    CURRENT MEDICATIONS:    methylPREDNISolone sodium succinate Injectable      LABS:	 	                              12.8   1.10  )-----------( 72       ( 2022 11:47 )             38.3         136  |  103  |  8   ----------------------------<  104<H>  3.9   |  28  |  0.74    Ca    8.5      2022 11:47    TPro  7.6  /  Alb  3.3  /  TBili  0.6  /  DBili  x   /  AST  36  /  ALT  29  /  AlkPhos  67    < from: CT Neck Soft Tissue w/ IV Cont (22 @ 12:58) >  FINDINGS:  Deep to the left sternocleidomastoid muscle there are 2 enhancing mass   lesions (sagittal 602-101). The more superiorly located mass lesion   measures 2.1 cm in height x 1.6 cm in AP diameter x 1.5 cm in width, with   peripheral enhancement and central decreased attenuation, possibly   representing central necrosis. The more inferiorly located mass lesion   measures approximately 1.5 cm in height  x 1.1 cm in AP diameter x 1.8 cm   in width. Asymmetric prominence of the left submandibular gland.    Asymmetricprominence of the left tonsillar pillar at the level the   oropharynx, without a discrete peritonsillar abscess collection.    Airway is patent.    PAROTID GLANDS:  Unremarkable.    THYROID GLAND:  Unremarkable.  VISUALIZED PARANASAL SINUSES:  Unremarkable.  VISUALIZED TYMPANOMASTOID CAVITIES: Unremarkable.  BONES:  Unremarkable.  MISCELLANEOUS:  Unremarkable.      IMPRESSION:  1. 2 enhancing mass lesions are appreciated deep to the left   sternocleidomastoid muscle, concerning for pathologic appearing level III   lymph nodes. While an infectious etiology is within the differential   diagnosis, findings are also concerning for the possibility of a   neoplastic process. Further assessment is advised. Consider soft tissue   sampling.  2. Asymmetric prominence of the left tonsillar pillar without evidence of   a peritonsillar abscess collection.  3. Asymmetric prominence of the left submandibular gland. As the right   submandibular gland is not clearly visualized, it is unclear if this   finding is inflammatory in etiology, congenital or postsurgical.   Correlate with patient history.    Findings were communicated by Dr. Behr Ventura to MICHELLE Tenorio in the   emergency department at approximately 1:30 PM on 2022.    < end of copied text >  < from: US Head + Neck Soft Tissue (22 @ 12:18) >  FINDINGS:  Right Lobe: 4.5 cm x  1.5 cm x 0.6 cm. Homogeneous echogenicity. No   nodule.    Left Lobe: 4.3 cm x 1.1 cm x 0.8 cm. Homogeneous echogenicity. No nodule.    Isthmus: 7 cm. No nodule.    CervicalLymph Nodes: Several enlarged lymph nodes with thickened cortex   in the left anterior cervical chain. Reference nodes as follows:  Level 3 lymph node measures 2.5 x 1.4 cm  Level 4 lymph node measures 1.5 x 1.1 cm.    IMPRESSION:    Lymph node enlargement left levels 3 and 4.    Normal appearance of the thyroid gland.      < end of copied text >  < from: Xray Chest 2 Views PA/Lat (22 @ 15:05) >  PA lateral.    Heart and mediastinum normal.  Lungs clear.  Costophrenic angles sharp   bilaterally.    IMPRESSION: Normal chest    < end of copied text >   (2022 16:12)      PAST MEDICAL & SURGICAL HISTORY:  No pertinent past medical history          MEDICATIONS  (STANDING):  acyclovir IVPB      acyclovir IVPB 280 milliGRAM(s) IV Intermittent every 8 hours  cefepime   IVPB 2000 milliGRAM(s) IV Intermittent every 8 hours  methylPREDNISolone sodium succinate Injectable 80 milliGRAM(s) IV Push three times a day    MEDICATIONS  (PRN):  acetaminophen     Tablet .. 650 milliGRAM(s) Oral once PRN Temp greater or equal to 38C (100.4F), Mild Pain (1 - 3)  ibuprofen  Tablet. 600 milliGRAM(s) Oral once PRN Temp greater or equal to 38C (100.4F), Mild Pain (1 - 3)      Allergies    No Known Allergies    Intolerances        FAMILY HISTORY:      Review of Systems    Constitutional, Eyes, ENT, Cardiovascular, Respiratory, Gastrointestinal, Genitourinary, Musculoskeletal, Integumentary, Neurological, Psychiatric, Endocrine, Heme/Lymph and Allergic/Immunologic review of systems are otherwise negative except as noted in HPI.     Vital Signs Last 24 Hrs  T(C): 36.9 (2022 05:04), Max: 38.4 (2022 11:19)  T(F): 98.4 (2022 05:04), Max: 101.2 (2022 11:19)  HR: 94 (2022 05:04) (84 - 108)  BP: 112/61 (2022 05:04) (100/59 - 112/61)  BP(mean): 69 (2022 19:56) (69 - 86)  RR: 16 (2022 05:04) (16 - 18)  SpO2: 100% (2022 05:04) (97% - 100%)    Parameters below as of 2022 05:04  Patient On (Oxygen Delivery Method): room air        Physical Exam  Eyes: PERRL, EOMI, no conjunctival infection, anicteric.   ENT: pharynx is unremarkable, moist mucus membrane, no oral lesions.   Neck: supple without JVD, no thyromegaly or masses appreciated.   Pulmonary: clear to auscultation bilaterally, no dullness, no wheezing.   Cardiac: RRR, normal S1S2, no murmurs, rubs, gallops.   Vascular: no JVD, no calf tenderness, venous stasis changes, varices.   Abdomen: normoactive bowel sounds, soft and nontender, no hepatosplenomegaly or masses appreciated.   Lymphatic: no peripheral adenopathy appreciated.   Musculoskeletal: full range of motion and no deformities appreciated.   Skin: normal appearance, no rash, nodules, vesicles, ulcers, erythema.   Neurology: grossly intact.   Psychiatric: affect appropriate.       LABS:                          11.4   1.97  )-----------( 82       ( 15 Nov 2022 07:00 )             34.6     CBC Full  -  ( 2022 07:18 )  WBC Count : 0.48 K/uL  RBC Count : 4.36 M/uL  Hemoglobin : 11.5 g/dL  Hematocrit : 35.0 %  Platelet Count - Automated : 76 K/uL  Mean Cell Volume : 80.3 fl  Mean Cell Hemoglobin : 26.4 pg  Mean Cell Hemoglobin Concentration : 32.9 gm/dL  Auto Neutrophil # : x  Auto Lymphocyte # : x  Auto Monocyte # : x  Auto Eosinophil # : x  Auto Basophil # : x  Auto Neutrophil % : x  Auto Lymphocyte % : x  Auto Monocyte % : x  Auto Eosinophil % : x  Auto Basophil % : x      11-15    140  |  109<H>  |  9   ----------------------------<  114<H>  3.5   |  29  |  0.65    Ca    8.1<L>      15 Nov 2022 07:00  Phos  2.7     11-15  Mg     2.0     11-15    TPro  6.8  /  Alb  2.9<L>  /  TBili  0.4  /  DBili  x   /  AST  28  /  ALT  25  /  AlkPhos  56  11-14    11-14    138  |  107  |  6<L>  ----------------------------<  268<H>  4.2   |  27  |  0.65    Ca    8.4<L>      2022 07:18  Phos  4.5     11-14  Mg     2.1     -14    TPro  6.8  /  Alb  2.9<L>  /  TBili  0.4  /  DBili  x   /  AST  28  /  ALT  25  /  AlkPhos  56  -14      Urinalysis Basic - ( 2022 14:44 )    Color: Yellow / Appearance: Clear / S.005 / pH: x  Gluc: x / Ketone: Moderate  / Bili: Negative / Urobili: 4   Blood: x / Protein: 30 mg/dL / Nitrite: Negative   Leuk Esterase: Trace / RBC: 0-2 /HPF / WBC 0-2   Sq Epi: x / Non Sq Epi: Occasional / Bacteria: Occasional        RADIOLOGY & ADDITIONAL STUDIES:

## 2022-11-15 NOTE — BRIEF OPERATIVE NOTE - OPERATION/FINDINGS
left neck node. 18 g temno core bx taken x 6. Three in formalin, 2 in RPMI and 1 for culture left neck node. 18 g temno core bx taken x 6. Three in formalin, 2 in RPMI and 1 for culture (including AFB and Fungal)

## 2022-11-15 NOTE — PROGRESS NOTE ADULT - SUBJECTIVE AND OBJECTIVE BOX
CC: 23 [facility] month fevers and L neck swelling. She reports B symptoms including L neck swelling, fevers worse at night, aching, white plaquing of tongue, mild malaise. She reports 1 month ago she had what she thought was an ear infection and then developed the swelling in her neck, followed by fevers after the swelling occurred. She says that she has been prescribed several antibiotics that have not seemed to make a difference and came to the ED when the neck felt more swollen and painful despite recent change of antibiotic. She denies any FH of lymphomas, interaction with sick individuals, or any remote travel within the past 5 years. ROS neg for sob, cp, n/v/d but she has restriction of movement due to pain from L side of neck. In ED she was given cefepime and an US then a CT of her neck were performed.     11/15 - no cp palps sob abdo pain dysuria, ANC better today; for LN biopsy and CT imaging today     Vital Signs Last 24 Hrs  T(C): 36.6 (15 Nov 2022 14:40), Max: 36.7 (15 Nov 2022 08:31)  T(F): 97.9 (15 Nov 2022 14:40), Max: 98.1 (15 Nov 2022 08:31)  HR: 71 (15 Nov 2022 14:40) (57 - 80)  BP: 96/57 (15 Nov 2022 14:40) (87/46 - 105/57)  BP(mean): 66 (15 Nov 2022 14:40) (66 - 66)  RR: 17 (15 Nov 2022 14:40) (16 - 17)  SpO2: 100% (15 Nov 2022 14:40) (98% - 100%) RA  Constitutional: NAD, awake and alert  HEENT: PERR, EOMI  Neck: Soft and supple,  No JVD  Respiratory: Breath sounds are clear bilaterally, No wheezing, rales or rhonchi  Cardiovascular: S1 and S2, regular rate and rhythm, no Murmurs  Gastrointestinal: Bowel Sounds present, soft, nontender, nondistended  Extremities: No peripheral edema  Vascular: 2+ peripheral pulses  Neurological: A/O x 3, no focal deficits  Musculoskeletal: 5/5 strength b/l upper and lower extremities  Skin: No rashes    RADIOLOGY/EKG:  < from: CT Neck Soft Tissue w/ IV Cont (11.13.22 @ 12:58) >  IMPRESSION:  1. 2 enhancing mass lesions are appreciated deep to the left   sternocleidomastoid muscle, concerning for pathologic appearing level III   lymph nodes. While an infectious etiology is within the differential   diagnosis, findings are also concerning for the possibility of a   neoplastic process. Further assessment is advised. Consider soft tissue   sampling.  2. Asymmetric prominence of the left tonsillar pillar without evidence of   a peritonsillar abscess collection.  3. Asymmetric prominence of the left submandibular gland. As the right   submandibular gland is not clearly visualized, it is unclear if this   finding is inflammatory in etiology, congenital or postsurgical.   Correlate with patient history.      LABS: All Labs Reviewed:                        11.4   1.97  )-----------( 82       ( 15 Nov 2022 07:00 )             34.6     11-15    140  |  109<H>  |  9   ----------------------------<  114<H>  3.5   |  29  |  0.65    Ca    8.1<L>      15 Nov 2022 07:00  Phos  2.7     11-15  Mg     2.0     11-15    RADIOLOGY/EKG:  < from: CT Abdomen and Pelvis w/ IV Cont (11.15.22 @ 13:44) >  IMPRESSION:  Multiple small left supraclavicular lymph nodes.  Otherwise essentially unremarkable CT of thechest, abdomen or pelvis. No   evidence of malignancy.      acetaminophen     Tablet .. 650 milliGRAM(s) Oral once PRN  acetaminophen     Tablet .. 650 milliGRAM(s) Oral every 6 hours PRN  cefepime   IVPB 2000 milliGRAM(s) IV Intermittent every 8 hours  ibuprofen  Tablet. 600 milliGRAM(s) Oral once PRN  sodium chloride 0.45%. 1000 milliLiter(s) IV Continuous <Continuous>

## 2022-11-15 NOTE — ED POST DISCHARGE NOTE - RESULT SUMMARY
Pt was admitted to  for Lymphoma work-up.  Inpatient team ordered shoulder Xray.  Inpatient team will f/soraya Li PA-C Pt was admitted to  for Lymphoma work-up. during ED workup, pt mentioned chronic left shoulder pain that had been eval by Ortho.  Radiology reporting mild left AC joint separation suggested.  Called Nurse Wen on [facility] that is caring for pt.  Will make treatment team aware.  Informed pt can be placed in Sling if needed.  JOSE Sousa PA-C

## 2022-11-15 NOTE — PROGRESS NOTE ADULT - ASSESSMENT
21 [facility] with 1 month fevers and L neck swelling. She reports B symptoms including L neck swelling, fevers worse at night, aching, white plaquing of tongue, mild malaise.    Plan:  #neck mass with fevers and night sweats/ B sx  -differential includes infectious, autoimmune, benign, lymphoma and other malignant disorders.  -common presentation of lymphoma includes lymphadenopathy, hepatosplenomegaly, fever, weight loss, and night sweats. Less common presentations include extranodal involvement, abnormal laboratory findings, and paraneoplastic syndromes.  11/12/22 CT neck :   Deep to the left sternocleidomastoid muscle there are 2 enhancing mass lesions. The more superiorly located mass lesion measures 2.1 cm in height x 1.6 cm in AP diameter x 1.5 cm in width, with peripheral enhancement and central decreased attenuation, possibly representing central necrosis. The more inferiorly located mass lesion measures approximately 1.5 cm in height  x 1.1 cm in AP diameter x 1.8 cm in width. Asymmetric prominence of the left submandibular gland. Asymmetric prominence of the left tonsillar pillar at the level the oropharynx, without a discrete peritonsillar abscess collection.  -I have reached out to ENT, Dr. Peck to review films - waiting to hear back  -CT CAP staging evaluation    #Cytopenias  -given cytopenias, sent flow cytometry on peripheral blood  -pt is neutropenic and afebrile  -ESR NL at 13   -IR for tissue biopsy for diagnosis and to determine the histologic subtype.  Will need biopsy of of most easily accessible mass pending CT CAP.  Lymph node excision, incisional biopsy, or multiple core needle biopsies needed for histology, immunophenotyping and cytogenetics  -monitor for tumor lysis syndrome with potassium, phosphorus, calcium, creatinine and uric acid and cont IVF - NL uric acid level.

## 2022-11-16 LAB
ANION GAP SERPL CALC-SCNC: 5 MMOL/L — SIGNIFICANT CHANGE UP (ref 5–17)
BASOPHILS # BLD AUTO: 0 K/UL — SIGNIFICANT CHANGE UP (ref 0–0.2)
BASOPHILS NFR BLD AUTO: 0 % — SIGNIFICANT CHANGE UP (ref 0–2)
BUN SERPL-MCNC: 11 MG/DL — SIGNIFICANT CHANGE UP (ref 7–23)
CALCIUM SERPL-MCNC: 8.3 MG/DL — LOW (ref 8.5–10.1)
CHLORIDE SERPL-SCNC: 112 MMOL/L — HIGH (ref 96–108)
CO2 SERPL-SCNC: 26 MMOL/L — SIGNIFICANT CHANGE UP (ref 22–31)
CREAT SERPL-MCNC: 0.54 MG/DL — SIGNIFICANT CHANGE UP (ref 0.5–1.3)
EGFR: 133 ML/MIN/1.73M2 — SIGNIFICANT CHANGE UP
EOSINOPHIL # BLD AUTO: 0.01 K/UL — SIGNIFICANT CHANGE UP (ref 0–0.5)
EOSINOPHIL NFR BLD AUTO: 0.4 % — SIGNIFICANT CHANGE UP (ref 0–6)
GLUCOSE SERPL-MCNC: 96 MG/DL — SIGNIFICANT CHANGE UP (ref 70–99)
HCT VFR BLD CALC: 34.2 % — LOW (ref 34.5–45)
HGB BLD-MCNC: 11.5 G/DL — SIGNIFICANT CHANGE UP (ref 11.5–15.5)
IMM GRANULOCYTES NFR BLD AUTO: 0.4 % — SIGNIFICANT CHANGE UP (ref 0–0.9)
LYMPHOCYTES # BLD AUTO: 0.78 K/UL — LOW (ref 1–3.3)
LYMPHOCYTES # BLD AUTO: 33.3 % — SIGNIFICANT CHANGE UP (ref 13–44)
MAGNESIUM SERPL-MCNC: 2.1 MG/DL — SIGNIFICANT CHANGE UP (ref 1.6–2.6)
MCHC RBC-ENTMCNC: 27.4 PG — SIGNIFICANT CHANGE UP (ref 27–34)
MCHC RBC-ENTMCNC: 33.6 GM/DL — SIGNIFICANT CHANGE UP (ref 32–36)
MCV RBC AUTO: 81.4 FL — SIGNIFICANT CHANGE UP (ref 80–100)
MONOCYTES # BLD AUTO: 0.17 K/UL — SIGNIFICANT CHANGE UP (ref 0–0.9)
MONOCYTES NFR BLD AUTO: 7.3 % — SIGNIFICANT CHANGE UP (ref 2–14)
NEUTROPHILS # BLD AUTO: 1.37 K/UL — LOW (ref 1.8–7.4)
NEUTROPHILS NFR BLD AUTO: 58.6 % — SIGNIFICANT CHANGE UP (ref 43–77)
PHOSPHATE SERPL-MCNC: 3.1 MG/DL — SIGNIFICANT CHANGE UP (ref 2.5–4.5)
PLATELET # BLD AUTO: 97 K/UL — LOW (ref 150–400)
POTASSIUM SERPL-MCNC: 3.7 MMOL/L — SIGNIFICANT CHANGE UP (ref 3.5–5.3)
POTASSIUM SERPL-SCNC: 3.7 MMOL/L — SIGNIFICANT CHANGE UP (ref 3.5–5.3)
RBC # BLD: 4.2 M/UL — SIGNIFICANT CHANGE UP (ref 3.8–5.2)
RBC # FLD: 12.4 % — SIGNIFICANT CHANGE UP (ref 10.3–14.5)
SODIUM SERPL-SCNC: 143 MMOL/L — SIGNIFICANT CHANGE UP (ref 135–145)
WBC # BLD: 2.34 K/UL — LOW (ref 3.8–10.5)
WBC # FLD AUTO: 2.34 K/UL — LOW (ref 3.8–10.5)

## 2022-11-16 PROCEDURE — 99232 SBSQ HOSP IP/OBS MODERATE 35: CPT

## 2022-11-16 RX ADMIN — CEFEPIME 100 MILLIGRAM(S): 1 INJECTION, POWDER, FOR SOLUTION INTRAMUSCULAR; INTRAVENOUS at 13:34

## 2022-11-16 RX ADMIN — CEFEPIME 100 MILLIGRAM(S): 1 INJECTION, POWDER, FOR SOLUTION INTRAMUSCULAR; INTRAVENOUS at 21:06

## 2022-11-16 RX ADMIN — CEFEPIME 100 MILLIGRAM(S): 1 INJECTION, POWDER, FOR SOLUTION INTRAMUSCULAR; INTRAVENOUS at 05:27

## 2022-11-16 NOTE — PROGRESS NOTE ADULT - SUBJECTIVE AND OBJECTIVE BOX
CC: 21 [facility] month fevers and L neck swelling. She reports B symptoms including L neck swelling, fevers worse at night, aching, white plaquing of tongue, mild malaise. She reports 1 month ago she had what she thought was an ear infection and then developed the swelling in her neck, followed by fevers after the swelling occurred. She says that she has been prescribed several antibiotics that have not seemed to make a difference and came to the ED when the neck felt more swollen and painful despite recent change of antibiotic. She denies any FH of lymphomas, interaction with sick individuals, or any remote travel within the past 5 years. ROS neg for sob, cp, n/v/d but she has restriction of movement due to pain from L side of neck. In ED she was given cefepime and an US then a CT of her neck were performed.     11/15 - no cp palps sob abdo pain dysuria, ANC better today; for LN biopsy and CT imaging today   11/16 - no cp palps sob abdo pain dysuria, ANC cont to improve     Vital Signs Last 24 Hrs  T(C): 36.8 (16 Nov 2022 20:33), Max: 36.8 (16 Nov 2022 20:33)  T(F): 98.2 (16 Nov 2022 20:33), Max: 98.2 (16 Nov 2022 20:33)  HR: 83 (16 Nov 2022 20:33) (83 - 103)  BP: 107/63 (16 Nov 2022 20:33) (93/66 - 107/63)  RR: 18 (16 Nov 2022 20:33) (16 - 18)  SpO2: 100% (16 Nov 2022 20:33) (99% - 100%)/RA   Constitutional: NAD, awake and alert  HEENT: PERR, EOMI  Neck: Soft and supple,  No JVD  Respiratory: Breath sounds are clear bilaterally, No wheezing, rales or rhonchi  Cardiovascular: S1 and S2, regular rate and rhythm, no Murmurs  Gastrointestinal: Bowel Sounds present, soft, nontender, nondistended  Extremities: No peripheral edema  Vascular: 2+ peripheral pulses  Neurological: A/O x 3, no focal deficits  Musculoskeletal: 5/5 strength b/l upper and lower extremities  Skin: No rashes    RADIOLOGY/EKG:  < from: CT Neck Soft Tissue w/ IV Cont (11.13.22 @ 12:58) >  IMPRESSION:  1. 2 enhancing mass lesions are appreciated deep to the left   sternocleidomastoid muscle, concerning for pathologic appearing level III   lymph nodes. While an infectious etiology is within the differential   diagnosis, findings are also concerning for the possibility of a   neoplastic process. Further assessment is advised. Consider soft tissue   sampling.  2. Asymmetric prominence of the left tonsillar pillar without evidence of   a peritonsillar abscess collection.  3. Asymmetric prominence of the left submandibular gland. As the right   submandibular gland is not clearly visualized, it is unclear if this   finding is inflammatory in etiology, congenital or postsurgical.   Correlate with patient history.    RADIOLOGY/EKG:  < from: CT Abdomen and Pelvis w/ IV Cont (11.15.22 @ 13:44) >  IMPRESSION:  Multiple small left supraclavicular lymph nodes.  Otherwise essentially unremarkable CT of thechest, abdomen or pelvis. No   evidence of malignancy.    LABS: All Labs Reviewed:                     11.5   2.34  )-----------( 97       ( 16 Nov 2022 06:51 )             34.2   143  |  112<H>  |  11  ----------------------------<  96  3.7   |  26  |  0.54  Ca    8.3<L>      16 Nov 2022 06:51  Phos  3.1     11-16  Mg     2.1     11-16      MEDS:   acetaminophen     Tablet .. 650 milliGRAM(s) Oral once PRN  acetaminophen     Tablet .. 650 milliGRAM(s) Oral every 6 hours PRN  cefepime   IVPB 2000 milliGRAM(s) IV Intermittent every 8 hours  ibuprofen  Tablet. 600 milliGRAM(s) Oral once PRN  sodium chloride 0.45%. 1000 milliLiter(s) IV Continuous <Continuous>

## 2022-11-16 NOTE — PROGRESS NOTE ADULT - NUTRITIONAL ASSESSMENT
This patient has been assessed with a concern for Malnutrition and has been determined to have a diagnosis/diagnoses of Severe protein-calorie malnutrition.    This patient is being managed with:   Diet Regular-  Entered: Nov 13 2022  2:24PM    

## 2022-11-16 NOTE — PROGRESS NOTE ADULT - SUBJECTIVE AND OBJECTIVE BOX
HPI:    23 yo F from [facility] originally with 1 month fevers and L neck swelling. She reports B symptoms including L neck swelling, fevers worse at night, aching, white plaquing of tongue, mild malaise. She reports 1 month ago she had what she thought was an ear infection and then developed the swelling in her neck, followed by fevers after the swelling occurred. She says that she has been prescribed several antibiotics that have not seemed to make a difference and came to the ED when the neck felt more swollen and painful despite recent change of antibiotic. She denies any FHx of lymphomas, interaction with sick individuals, or any remote travel within the past 5 years.     11/16/2022: Patient was seen today at bedside, feeling well, no new complaints overnight, tolerating PO intake          PAST MEDICAL & SURGICAL HISTORY:    No pertinent past medical history        MEDICATIONS  (STANDING):  acyclovir IVPB      acyclovir IVPB 280 milliGRAM(s) IV Intermittent every 8 hours  cefepime   IVPB 2000 milliGRAM(s) IV Intermittent every 8 hours  methylPREDNISolone sodium succinate Injectable 80 milliGRAM(s) IV Push three times a day    MEDICATIONS  (PRN):  acetaminophen     Tablet .. 650 milliGRAM(s) Oral once PRN Temp greater or equal to 38C (100.4F), Mild Pain (1 - 3)  ibuprofen  Tablet. 600 milliGRAM(s) Oral once PRN Temp greater or equal to 38C (100.4F), Mild Pain (1 - 3)      Allergies:    No Known Allergies    Intolerances:        FAMILY HISTORY:          REVIEW OF SYSTEMS:    Constitutional, Eyes, ENT, Cardiovascular, Respiratory, Gastrointestinal, Genitourinary, Musculoskeletal, Integumentary, Neurological, Psychiatric, Endocrine, Heme/Lymph and Allergic/Immunologic review of systems are otherwise negative except as noted in HPI.         Vitals:     ICU Vital Signs Last 24 Hrs  T(C): 36.7 (16 Nov 2022 08:13), Max: 36.7 (16 Nov 2022 08:13)  T(F): 98.1 (16 Nov 2022 08:13), Max: 98.1 (16 Nov 2022 08:13)  HR: 99 (16 Nov 2022 08:13) (94 - 99)  BP: 93/66 (16 Nov 2022 08:13) (93/66 - 97/70)  BP(mean): --  Allergic/Immunologic:	ABP: --  ABP(mean): --  RR: 16 (16 Nov 2022 08:13) (16 - 18)  SpO2: 99% (16 Nov 2022 08:13) (99% - 100%)    O2 Parameters below as of 16 Nov 2022 08:13  Patient On (Oxygen Delivery Method): room air        PHYSICAL EXAM:    Appearance: Comfortable. No acute distress  HEENT:  Head and neck: Atraumatic. Normocephalic. B/L SCM swelling noted, less unilateral than expected from CTA.  Normal oral mucosa apart from white coat on tongue which is diffuse and thin, not foul smelling. PERRL, Neck has tenderness throughout, in particular L SCM but also present R side. No JVD  Neurologic: A & O x 3, no focal deficits. EOMI , Cranial nerves are intact.  Cardiovascular: Normal S1 S2, No murmur, rubs/gallops. No JVD, No edema  Respiratory: Lungs clear to auscultation  Gastrointestinal:  Soft, Non-tender, + BS  Lower Extremities: No edema  Psychiatry: Patient is calm. No agitation. Mood & affect appropriate  Skin: No rashes/ ecchymoses/cyanosis/ulcers visualized on the face, hands or feet.    CURRENT MEDICATIONS:    methylPREDNISolone sodium succinate Injectable      LABS:	 	    CBC Full  -  ( 16 Nov 2022 06:51 )  WBC Count : 2.34 K/uL  RBC Count : 4.20 M/uL  Hemoglobin : 11.5 g/dL  Hematocrit : 34.2 %  Platelet Count - Automated : 97 K/uL  Mean Cell Volume : 81.4 fl  Mean Cell Hemoglobin : 27.4 pg  Mean Cell Hemoglobin Concentration : 33.6 gm/dL  Auto Neutrophil # : 1.37 K/uL  Auto Lymphocyte # : 0.78 K/uL  Auto Monocyte # : 0.17 K/uL  Auto Eosinophil # : 0.01 K/uL  Auto Basophil # : 0.00 K/uL  Auto Neutrophil % : 58.6 %  Auto Lymphocyte % : 33.3 %  Auto Monocyte % : 7.3 %  Auto Eosinophil % : 0.4 %  Auto Basophil % : 0.0 %                          11.5   2.34  )-----------( 97       ( 16 Nov 2022 06:51 )             34.2     11-16    143  |  112<H>  |  11  ----------------------------<  96  3.7   |  26  |  0.54    Ca    8.3<L>      16 Nov 2022 06:51  Phos  3.1     11-16  Mg     2.1     11-16        RADIOLOGY:        CT Abdomen and Pelvis w/ IV Cont (11.15.22 @ 13:44)     IMPRESSION:    Multiple small left supraclavicular lymph nodes.  Otherwise essentially unremarkable CT of the chest, abdomen or pelvis. No   evidence of malignancy.          CT Neck Soft Tissue w/ IV Cont (11.13.22 @ 12:58)    FINDINGS:    Deep to the left sternocleidomastoid muscle there are 2 enhancing mass   lesions (sagittal 602-101). The more superiorly located mass lesion   measures 2.1 cm in height x 1.6 cm in AP diameter x 1.5 cm in width, with   peripheral enhancement and central decreased attenuation, possibly   representing central necrosis. The more inferiorly located mass lesion   measures approximately 1.5 cm in height  x 1.1 cm in AP diameter x 1.8 cm   in width. Asymmetric prominence of the left submandibular gland.    Asymmetricprominence of the left tonsillar pillar at the level the   oropharynx, without a discrete peritonsillar abscess collection.    Airway is patent.    PAROTID GLANDS:  Unremarkable.    THYROID GLAND:  Unremarkable.  VISUALIZED PARANASAL SINUSES:  Unremarkable.  VISUALIZED TYMPANOMASTOID CAVITIES: Unremarkable.  BONES:  Unremarkable.  MISCELLANEOUS:  Unremarkable.    IMPRESSION:    1. 2 enhancing mass lesions are appreciated deep to the left   sternocleidomastoid muscle, concerning for pathologic appearing level III   lymph nodes. While an infectious etiology is within the differential   diagnosis, findings are also concerning for the possibility of a   neoplastic process. Further assessment is advised. Consider soft tissue   sampling.  2. Asymmetric prominence of the left tonsillar pillar without evidence of   a peritonsillar abscess collection.  3. Asymmetric prominence of the left submandibular gland. As the right   submandibular gland is not clearly visualized, it is unclear if this   finding is inflammatory in etiology, congenital or postsurgical.   Correlate with patient history.    Findings were communicated by Dr. Behr Ventura to MICHELLE Tenorio in the   emergency department at approximately 1:30 PM on 11/13/2022.          US Head + Neck Soft Tissue (11.13.22 @ 12:18)    FINDINGS:    Right Lobe: 4.5 cm x  1.5 cm x 0.6 cm. Homogeneous echogenicity. No   nodule.    Left Lobe: 4.3 cm x 1.1 cm x 0.8 cm. Homogeneous echogenicity. No nodule.    Isthmus: 7 cm. No nodule.    CervicalLymph Nodes: Several enlarged lymph nodes with thickened cortex   in the left anterior cervical chain. Reference nodes as follows:  Level 3 lymph node measures 2.5 x 1.4 cm  Level 4 lymph node measures 1.5 x 1.1 cm.    IMPRESSION:    Lymph node enlargement left levels 3 and 4.    Normal appearance of the thyroid gland.          Xray Chest 2 Views PA/Lat (11.11.22 @ 15:05)   PA lateral.    Heart and mediastinum normal.  Lungs clear.  Costophrenic angles sharp   bilaterally.    IMPRESSION: Normal chest          IR Procedure (11.15.22 @ 16:03)    Impression:    1. Successful ultrasound-guided left cervical lymph node biopsy

## 2022-11-16 NOTE — PROGRESS NOTE ADULT - ASSESSMENT
21 yo F from [facility] originally with 1 month fevers and L neck swelling. She reports B symptoms including L neck swelling, fevers worse at night, aching, white plaquing of tongue, mild malaise. She has been doing well since her admission, no acute symptoms at this time.      # B like symptoms (neck swelling, fever/chills, night sweats)  - Improving since admission   - Differential includes infectious, autoimmune, benign, lymphoma and other malignant disorders  - Common presentation of lymphoma includes lymphadenopathy, hepatosplenomegaly, fever, weight loss, and night sweats. Less common presentations include extranodal involvement, abnormal laboratory findings, and paraneoplastic syndromes.    11/12/22 CT Neck :   Deep to the left sternocleidomastoid muscle there are 2 enhancing mass lesions. The more superiorly located mass lesion measures 2.1 cm in height x 1.6 cm in AP diameter x 1.5 cm in width, with peripheral enhancement and central decreased attenuation, possibly representing central necrosis. The more inferiorly located mass lesion measures approximately 1.5 cm in height  x 1.1 cm in AP diameter x 1.8 cm in width. Asymmetric prominence of the left submandibular gland. Asymmetric prominence of the left tonsillar pillar at the level the oropharynx, without a discrete peritonsillar abscess collection.  11/15/2022 CT CAP: Multiple small left supraclavicular lymph nodes. Otherwise essentially unremarkable CT of the chest, abdomen or pelvis. No evidence of malignancy.      # Cytopenias  - Flow cytometry results:   The myeloid immunophenotypic findings show no increase in myeloid immaturity.  The lymphocyte immunophenotypic findings show no diagnostic abnormalities.    INTERPRETATION:  MORPHOLOGY: Neutropenia, small lymphocytes, occasional larger lymphoid cells with nuclear atypia.    - WBCs trending upward daily, afebrile   - Successful IR biopsy 11/15/2022: L neck node, 18 g temno core bx taken x 6. Three in formalin, two in RPMI and one for culture (including AFB and Fungal), pending pathology report  - Continue to monitor for tumor lysis syndrome with potassium, phosphorus, calcium, creatinine and uric acid and cont IVF 23 yo F from [facility] originally with 1 month fevers and L neck swelling. She reports B symptoms including L neck swelling, fevers worse at night, aching, white plaquing of tongue, mild malaise. She has been doing well since her admission, no acute symptoms at this time.    # B like symptoms (neck swelling, fever/chills, night sweats)  - Improving since admission   - Differential includes infectious, autoimmune, benign, lymphoma and other malignant disorders  - Common presentation of lymphoma includes lymphadenopathy, hepatosplenomegaly, fever, weight loss, and night sweats. Less common presentations include extranodal involvement, abnormal laboratory findings, and paraneoplastic syndromes.  -11/12/22 CT Neck :   Deep to the left sternocleidomastoid muscle there are 2 enhancing mass lesions. The more superiorly located mass lesion measures 2.1 cm in height x 1.6 cm in AP diameter x 1.5 cm in width, with peripheral enhancement and central decreased attenuation, possibly representing central necrosis. The more inferiorly located mass lesion measures approximately 1.5 cm in height  x 1.1 cm in AP diameter x 1.8 cm in width. Asymmetric prominence of the left submandibular gland. Asymmetric prominence of the left tonsillar pillar at the level the oropharynx, without a discrete peritonsillar abscess collection.  -11/15/2022 CT CAP: Multiple small left supraclavicular lymph nodes. Otherwise essentially unremarkable CT of the chest, abdomen or pelvis. No evidence of malignancy.    # Cytopenias  - Flow cytometry results: The myeloid immunophenotypic findings show no increase in myeloid immaturity.  The lymphocyte immunophenotypic findings show no diagnostic abnormalities.  INTERPRETATION:  MORPHOLOGY: Neutropenia, small lymphocytes, occasional larger lymphoid cells with nuclear atypia.  - WBCs trending upward daily, afebrile   - S/p  IR biopsy 11/15/2022: L neck node; pending pathology report  - Continue to monitor for tumor lysis syndrome with potassium, phosphorus, calcium, creatinine and uric acid and cont IVF 21 yo F from [facility] originally with 1 month fevers and L neck swelling. She reports B symptoms including L neck swelling, fevers worse at night, aching, white plaquing of tongue, mild malaise. She has been doing well since her admission, no acute symptoms at this time.    # B like symptoms (neck swelling, fever/chills, night sweats)  - Improving since admission   - Differential includes infectious, autoimmune, benign, lymphoma and other malignant disorders  - Common presentation of lymphoma includes lymphadenopathy, hepatosplenomegaly, fever, weight loss, and night sweats. Less common presentations include extranodal involvement, abnormal laboratory findings, and paraneoplastic syndromes.  - 11/12/22 CT Neck :   Deep to the left sternocleidomastoid muscle there are 2 enhancing mass lesions. The more superiorly located mass lesion measures 2.1 cm in height x 1.6 cm in AP diameter x 1.5 cm in width, with peripheral enhancement and central decreased attenuation, possibly representing central necrosis. The more inferiorly located mass lesion measures approximately 1.5 cm in height  x 1.1 cm in AP diameter x 1.8 cm in width. Asymmetric prominence of the left submandibular gland. Asymmetric prominence of the left tonsillar pillar at the level the oropharynx, without a discrete peritonsillar abscess collection.  - 11/15/2022 CT CAP: Multiple small left supraclavicular lymph nodes. Otherwise essentially unremarkable CT of the chest, abdomen or pelvis. No evidence of malignancy.  - ENT, Dr. Peck to review films contacted    # Cytopenias  - Flow cytometry results: The myeloid immunophenotypic findings show no increase in myeloid immaturity.  The lymphocyte immunophenotypic findings show no diagnostic abnormalities.  INTERPRETATION:  MORPHOLOGY: Neutropenia, small lymphocytes, occasional larger lymphoid cells with nuclear atypia.  - WBCs trending upward daily, afebrile   - S/p  IR biopsy 11/15/2022: L neck node; pending pathology report  - Continue to monitor for tumor lysis syndrome with potassium, phosphorus, calcium, creatinine and uric acid and cont IVF 23 yo F from [facility] originally with 1 month fevers and L neck swelling. She reports B symptoms including L neck swelling, fevers worse at night, aching, white plaquing of tongue, mild malaise. She has been doing well since her admission, no acute symptoms at this time.    # B like symptoms (neck swelling, fever/chills, night sweats)  - Improving since admission   - Differential includes infectious, autoimmune, benign, lymphoma and other malignant disorders  - Common presentation of lymphoma includes lymphadenopathy, hepatosplenomegaly, fever, weight loss, and night sweats. Less common presentations include extranodal involvement, abnormal laboratory findings, and paraneoplastic syndromes.  - 11/12/22 CT Neck :   Deep to the left sternocleidomastoid muscle there are 2 enhancing mass lesions. The more superiorly located mass lesion measures 2.1 cm in height x 1.6 cm in AP diameter x 1.5 cm in width, with peripheral enhancement and central decreased attenuation, possibly representing central necrosis. The more inferiorly located mass lesion measures approximately 1.5 cm in height  x 1.1 cm in AP diameter x 1.8 cm in width. Asymmetric prominence of the left submandibular gland. Asymmetric prominence of the left tonsillar pillar at the level the oropharynx, without a discrete peritonsillar abscess collection.  - 11/15/2022 CT CAP: Multiple small left supraclavicular lymph nodes. Otherwise essentially unremarkable CT of the chest, abdomen or pelvis. No evidence of malignancy.  - Contacted ENT, Dr. Peck to review films     # Cytopenias  - Flow cytometry results: The myeloid immunophenotypic findings show no increase in myeloid immaturity.  The lymphocyte immunophenotypic findings show no diagnostic abnormalities.  INTERPRETATION:  MORPHOLOGY: Neutropenia, small lymphocytes, occasional larger lymphoid cells with nuclear atypia.  - WBCs trending upward daily, afebrile   - S/p  IR biopsy 11/15/2022: L neck node; pending pathology report  - Continue to monitor for tumor lysis syndrome with potassium, phosphorus, calcium, creatinine and uric acid and cont IVF

## 2022-11-16 NOTE — PROGRESS NOTE ADULT - ASSESSMENT
Neutropenic Fever   CT with enlarged necrotic lymph nodes - left neck   No evidence of acute infection apart from fever, and notably not responding to PO antibiotics as outpt.   Given above clinical hx suspect viral/lymphoid process as opposed to bacterial infectious and will empirically continue cefepime for now however the concern is for EBV related sequelae.   DC IV steroids; DC acyclovir   Tylenol PRN  Follow up serum studies, EBV testing, HIV  Viral vs lymphoma induced  Check CBC with differential daily, if ANC <500 stop antiviral  Consultation with heme/onc and ID.  s/p cervical LN biopsy under LA  CT Chest Abdo Pelvis - no mass noted   temps and anc cont to be stable - consider dc tmr if afebrile and ANC cont to trend up     IVFs     VTE Px - SCD/OOB - is ambulating

## 2022-11-17 ENCOUNTER — TRANSCRIPTION ENCOUNTER (OUTPATIENT)
Age: 22
End: 2022-11-17

## 2022-11-17 VITALS
DIASTOLIC BLOOD PRESSURE: 69 MMHG | SYSTOLIC BLOOD PRESSURE: 98 MMHG | RESPIRATION RATE: 18 BRPM | HEART RATE: 89 BPM | TEMPERATURE: 98 F | OXYGEN SATURATION: 100 %

## 2022-11-17 LAB
ANION GAP SERPL CALC-SCNC: 4 MMOL/L — LOW (ref 5–17)
BASOPHILS # BLD AUTO: 0 K/UL — SIGNIFICANT CHANGE UP (ref 0–0.2)
BASOPHILS NFR BLD AUTO: 0 % — SIGNIFICANT CHANGE UP (ref 0–2)
BUN SERPL-MCNC: 11 MG/DL — SIGNIFICANT CHANGE UP (ref 7–23)
CALCIUM SERPL-MCNC: 8.2 MG/DL — LOW (ref 8.5–10.1)
CHLORIDE SERPL-SCNC: 107 MMOL/L — SIGNIFICANT CHANGE UP (ref 96–108)
CO2 SERPL-SCNC: 28 MMOL/L — SIGNIFICANT CHANGE UP (ref 22–31)
CREAT SERPL-MCNC: 0.58 MG/DL — SIGNIFICANT CHANGE UP (ref 0.5–1.3)
EGFR: 130 ML/MIN/1.73M2 — SIGNIFICANT CHANGE UP
EOSINOPHIL # BLD AUTO: 0.02 K/UL — SIGNIFICANT CHANGE UP (ref 0–0.5)
EOSINOPHIL NFR BLD AUTO: 1 % — SIGNIFICANT CHANGE UP (ref 0–6)
GLUCOSE SERPL-MCNC: 92 MG/DL — SIGNIFICANT CHANGE UP (ref 70–99)
HCT VFR BLD CALC: 36 % — SIGNIFICANT CHANGE UP (ref 34.5–45)
HGB BLD-MCNC: 12.1 G/DL — SIGNIFICANT CHANGE UP (ref 11.5–15.5)
LYMPHOCYTES # BLD AUTO: 0.7 K/UL — LOW (ref 1–3.3)
LYMPHOCYTES # BLD AUTO: 34 % — SIGNIFICANT CHANGE UP (ref 13–44)
MAGNESIUM SERPL-MCNC: 2 MG/DL — SIGNIFICANT CHANGE UP (ref 1.6–2.6)
MCHC RBC-ENTMCNC: 27.2 PG — SIGNIFICANT CHANGE UP (ref 27–34)
MCHC RBC-ENTMCNC: 33.6 GM/DL — SIGNIFICANT CHANGE UP (ref 32–36)
MCV RBC AUTO: 80.9 FL — SIGNIFICANT CHANGE UP (ref 80–100)
MONOCYTES # BLD AUTO: 0.14 K/UL — SIGNIFICANT CHANGE UP (ref 0–0.9)
MONOCYTES NFR BLD AUTO: 7 % — SIGNIFICANT CHANGE UP (ref 2–14)
NEUTROPHILS # BLD AUTO: 1.16 K/UL — LOW (ref 1.8–7.4)
NEUTROPHILS NFR BLD AUTO: 56 % — SIGNIFICANT CHANGE UP (ref 43–77)
NRBC # BLD: SIGNIFICANT CHANGE UP /100 WBCS (ref 0–0)
PHOSPHATE SERPL-MCNC: 3 MG/DL — SIGNIFICANT CHANGE UP (ref 2.5–4.5)
PLATELET # BLD AUTO: 105 K/UL — LOW (ref 150–400)
POTASSIUM SERPL-MCNC: 3.6 MMOL/L — SIGNIFICANT CHANGE UP (ref 3.5–5.3)
POTASSIUM SERPL-SCNC: 3.6 MMOL/L — SIGNIFICANT CHANGE UP (ref 3.5–5.3)
RBC # BLD: 4.45 M/UL — SIGNIFICANT CHANGE UP (ref 3.8–5.2)
RBC # FLD: 12.3 % — SIGNIFICANT CHANGE UP (ref 10.3–14.5)
SODIUM SERPL-SCNC: 139 MMOL/L — SIGNIFICANT CHANGE UP (ref 135–145)
WBC # BLD: 2.07 K/UL — LOW (ref 3.8–10.5)
WBC # FLD AUTO: 2.07 K/UL — LOW (ref 3.8–10.5)

## 2022-11-17 PROCEDURE — 99239 HOSP IP/OBS DSCHRG MGMT >30: CPT

## 2022-11-17 RX ORDER — ACETAMINOPHEN 500 MG
2 TABLET ORAL
Qty: 0 | Refills: 0 | DISCHARGE
Start: 2022-11-17

## 2022-11-17 RX ORDER — CEPHALEXIN 500 MG
1 CAPSULE ORAL
Qty: 0 | Refills: 0 | DISCHARGE

## 2022-11-17 RX ADMIN — Medication 650 MILLIGRAM(S): at 05:34

## 2022-11-17 RX ADMIN — CEFEPIME 100 MILLIGRAM(S): 1 INJECTION, POWDER, FOR SOLUTION INTRAMUSCULAR; INTRAVENOUS at 13:50

## 2022-11-17 RX ADMIN — Medication 650 MILLIGRAM(S): at 06:53

## 2022-11-17 RX ADMIN — CEFEPIME 100 MILLIGRAM(S): 1 INJECTION, POWDER, FOR SOLUTION INTRAMUSCULAR; INTRAVENOUS at 05:32

## 2022-11-17 RX ADMIN — Medication 650 MILLIGRAM(S): at 11:05

## 2022-11-17 NOTE — DISCHARGE NOTE PROVIDER - PROVIDER TOKENS
PROVIDER:[TOKEN:[[id number]],FOLLOWUP:[1 week]],FREE:[LAST:[stybel],FIRST:[nabil PCP],PHONE:[(   )    -],FAX:[(   )    -],FOLLOWUP:[1 week]]

## 2022-11-17 NOTE — PROGRESS NOTE ADULT - ASSESSMENT
21 yo F from [facility] originally with 1 month fevers and L neck swelling. She reports B symptoms including L neck swelling, fevers worse at night, aching, white plaquing of tongue, mild malaise. She has been doing well since her admission, no acute symptoms at this time.    # B like symptoms (neck swelling, fever/chills, night sweats)  - Continuing to improve daily, no fevers/night sweats recorded   - Differential includes infectious, autoimmune, benign, lymphoma and other malignant disorders  - Common presentation of lymphoma includes lymphadenopathy, hepatosplenomegaly, fever, weight loss, and night sweats. Less common presentations include extranodal involvement, abnormal laboratory findings, and paraneoplastic syndromes.  -11/12/22 CT Neck :   Deep to the left sternocleidomastoid muscle there are 2 enhancing mass lesions. The more superiorly located mass lesion measures 2.1 cm in height x 1.6 cm in AP diameter x 1.5 cm in width, with peripheral enhancement and central decreased attenuation, possibly representing central necrosis. The more inferiorly located mass lesion measures approximately 1.5 cm in height  x 1.1 cm in AP diameter x 1.8 cm in width. Asymmetric prominence of the left submandibular gland. Asymmetric prominence of the left tonsillar pillar at the level the oropharynx, without a discrete peritonsillar abscess collection.  -11/15/2022 CT CAP: Multiple small left supraclavicular lymph nodes. Otherwise essentially unremarkable CT of the chest, abdomen or pelvis. No evidence of malignancy.      # Cytopenias  - Flow cytometry results: The myeloid immunophenotypic findings show no increase in myeloid immaturity.  The lymphocyte immunophenotypic findings show no diagnostic abnormalities.  INTERPRETATION:  MORPHOLOGY: Neutropenia, small lymphocytes, occasional larger lymphoid cells with nuclear atypia.  - WBCs trending upward daily, afebrile   - S/p  IR biopsy 11/15/2022: L neck node; pending pathology report    Patient stable at this time with no new recorded complaints, issues, events, can follow up outpatient with Heme/Onc Dr Gayle     21 yo F from [facility] originally with 1 month fevers and L neck swelling. She reports B symptoms including L neck swelling, fevers worse at night, aching, white plaquing of tongue, mild malaise. She has been doing well since her admission, no acute symptoms at this time.    # B like symptoms (neck swelling, fever/chills, night sweats)  - Continuing to improve daily, no fevers/night sweats recorded   - Differential includes infectious, autoimmune, benign, lymphoma and other malignant disorders  - Common presentation of lymphoma includes lymphadenopathy, hepatosplenomegaly, fever, weight loss, and night sweats. Less common presentations include extranodal involvement, abnormal laboratory findings, and paraneoplastic syndromes.  -11/12/22 CT Neck :   Deep to the left sternocleidomastoid muscle there are 2 enhancing mass lesions. The more superiorly located mass lesion measures 2.1 cm in height x 1.6 cm in AP diameter x 1.5 cm in width, with peripheral enhancement and central decreased attenuation, possibly representing central necrosis. The more inferiorly located mass lesion measures approximately 1.5 cm in height  x 1.1 cm in AP diameter x 1.8 cm in width. Asymmetric prominence of the left submandibular gland. Asymmetric prominence of the left tonsillar pillar at the level the oropharynx, without a discrete peritonsillar abscess collection.  -11/15/2022 CT CAP: Multiple small left supraclavicular lymph nodes. Otherwise essentially unremarkable CT of the chest, abdomen or pelvis. No evidence of malignancy.      # Cytopenias  - Flow cytometry results: The myeloid immunophenotypic findings show no increase in myeloid immaturity.  The lymphocyte immunophenotypic findings show no diagnostic abnormalities.  INTERPRETATION:  MORPHOLOGY: Neutropenia, small lymphocytes, occasional larger lymphoid cells with nuclear atypia.  - WBCs trending upward daily, afebrile   - S/p IR biopsy 11/15/2022: L neck node; pending pathology report  - Recommend ANC level to determine degree of neutropenia        21 yo F from [facility] originally with 1 month fevers and L neck swelling. She reports B symptoms including L neck swelling, fevers worse at night, aching, white plaquing of tongue, mild malaise. She has been doing well since her admission, no acute symptoms at this time.    # B like symptoms (neck swelling, fever/chills, night sweats)  - Continuing to improve daily, no fevers/night sweats recorded   - Differential includes infectious, autoimmune, benign, lymphoma and other malignant disorders  - Common presentation of lymphoma includes lymphadenopathy, hepatosplenomegaly, fever, weight loss, and night sweats. Less common presentations include extranodal involvement, abnormal laboratory findings, and paraneoplastic syndromes.  -11/12/22 CT Neck :   Deep to the left sternocleidomastoid muscle there are 2 enhancing mass lesions. The more superiorly located mass lesion measures 2.1 cm in height x 1.6 cm in AP diameter x 1.5 cm in width, with peripheral enhancement and central decreased attenuation, possibly representing central necrosis. The more inferiorly located mass lesion measures approximately 1.5 cm in height  x 1.1 cm in AP diameter x 1.8 cm in width. Asymmetric prominence of the left submandibular gland. Asymmetric prominence of the left tonsillar pillar at the level the oropharynx, without a discrete peritonsillar abscess collection.  -11/15/2022 CT CAP: Multiple small left supraclavicular lymph nodes. Otherwise essentially unremarkable CT of the chest, abdomen or pelvis. No evidence of malignancy.      # Cytopenias  - Flow cytometry results: The myeloid immunophenotypic findings show no increase in myeloid immaturity.  The lymphocyte immunophenotypic findings show no diagnostic abnormalities.  INTERPRETATION:  MORPHOLOGY: Neutropenia, small lymphocytes, occasional larger lymphoid cells with nuclear atypia.  - WBCs trending upward daily, afebrile   - S/p IR biopsy 11/15/2022: L neck node; pending pathology report  - ANC 1.16 as of 11/17/2022    Patient stable at this time, afebrile currently, okay to discharge as per Heme/Onc Dr Gayle, follow neutropenic precautions, return to [facility] if fever develops, can see Heme/Onc George outpatient.         21 yo F from [facility] originally with 1 month fevers and L neck swelling. She reports B symptoms including L neck swelling, fevers worse at night, aching, white plaquing of tongue, mild malaise. She has been doing well since her admission, no acute symptoms at this time.    # B like symptoms (neck swelling, fever/chills, night sweats)  - Continuing to improve daily, no fevers/night sweats recorded   - Differential includes infectious, autoimmune, benign, lymphoma and other malignant disorders  - Common presentation of lymphoma includes lymphadenopathy, hepatosplenomegaly, fever, weight loss, and night sweats. Less common presentations include extranodal involvement, abnormal laboratory findings, and paraneoplastic syndromes.  -11/12/22 CT Neck :   Deep to the left sternocleidomastoid muscle there are 2 enhancing mass lesions. The more superiorly located mass lesion measures 2.1 cm in height x 1.6 cm in AP diameter x 1.5 cm in width, with peripheral enhancement and central decreased attenuation, possibly representing central necrosis. The more inferiorly located mass lesion measures approximately 1.5 cm in height  x 1.1 cm in AP diameter x 1.8 cm in width. Asymmetric prominence of the left submandibular gland. Asymmetric prominence of the left tonsillar pillar at the level the oropharynx, without a discrete peritonsillar abscess collection.  -11/15/2022 CT CAP: Multiple small left supraclavicular lymph nodes. Otherwise essentially unremarkable CT of the chest, abdomen or pelvis. No evidence of malignancy.      # Cytopenias  - Flow cytometry results: The myeloid immunophenotypic findings show no increase in myeloid immaturity.  The lymphocyte immunophenotypic findings show no diagnostic abnormalities.  INTERPRETATION:  MORPHOLOGY: Neutropenia, small lymphocytes, occasional larger lymphoid cells with nuclear atypia.  - S/p IR biopsy 11/15/2022: L neck node; pending pathology report  - ANC 1.16 as of 11/17/2022, remains afebrile    Patient stable at this time, afebrile currently, okay to discharge as per Heme/Onc Dr Gayle, follow neutropenic precautions, return to ED if fever develops, can see Heme/Onc George outpatient.         21 yo F from [facility] originally with 1 month fevers and L neck swelling. She reports B symptoms including L neck swelling, fevers worse at night, aching, white plaquing of tongue, mild malaise. She has been doing well since her admission, no acute symptoms at this time.    # B like symptoms (neck swelling, fever/chills, night sweats)  - Continuing to improve daily, no fevers/night sweats recorded   - Differential includes infectious, autoimmune, benign, lymphoma and other malignant disorders  - Common presentation of lymphoma includes lymphadenopathy, hepatosplenomegaly, fever, weight loss, and night sweats. Less common presentations include extranodal involvement, abnormal laboratory findings, and paraneoplastic syndromes.  -11/12/22 CT Neck :   Deep to the left sternocleidomastoid muscle there are 2 enhancing mass lesions. The more superiorly located mass lesion measures 2.1 cm in height x 1.6 cm in AP diameter x 1.5 cm in width, with peripheral enhancement and central decreased attenuation, possibly representing central necrosis. The more inferiorly located mass lesion measures approximately 1.5 cm in height  x 1.1 cm in AP diameter x 1.8 cm in width. Asymmetric prominence of the left submandibular gland. Asymmetric prominence of the left tonsillar pillar at the level the oropharynx, without a discrete peritonsillar abscess collection.  -11/15/2022 CT CAP: Multiple small left supraclavicular lymph nodes. Otherwise essentially unremarkable CT of the chest, abdomen or pelvis. No evidence of malignancy.      # Cytopenias  - Flow cytometry results: The myeloid immunophenotypic findings show no increase in myeloid immaturity.  The lymphocyte immunophenotypic findings show no diagnostic abnormalities.  INTERPRETATION:  MORPHOLOGY: Neutropenia, small lymphocytes, occasional larger lymphoid cells with nuclear atypia.  - S/p IR biopsy 11/15/2022: L neck node; pending pathology report  - Potassium, phosphorus, calcium, creatinine and uric acid stable  - ANC 1.16 as of 11/17/2022, remains afebrile    Patient stable at this time, afebrile currently, okay to discharge as per Heme/Onc Dr Gayle, follow neutropenic precautions, return to [facility] if fever develops, can see Heme/Onc George outpatient.         23 yo F from [facility] originally with 1 month fevers and L neck swelling. She reports B symptoms including L neck swelling, fevers worse at night, aching, white plaquing of tongue, mild malaise. She has been doing well since her admission, no acute symptoms at this time.    # B like symptoms (neck swelling, fever/chills, night sweats)  - Continuing to improve daily, no fevers/night sweats recorded   - Differential includes infectious, autoimmune, benign, lymphoma and other malignant disorders  - Common presentation of lymphoma includes lymphadenopathy, hepatosplenomegaly, fever, weight loss, and night sweats. Less common presentations include extranodal involvement, abnormal laboratory findings, and paraneoplastic syndromes.  - 11/12/22 CT Neck :   Deep to the left sternocleidomastoid muscle there are 2 enhancing mass lesions. The more superiorly located mass lesion measures 2.1 cm in height x 1.6 cm in AP diameter x 1.5 cm in width, with peripheral enhancement and central decreased attenuation, possibly representing central necrosis. The more inferiorly located mass lesion measures approximately 1.5 cm in height  x 1.1 cm in AP diameter x 1.8 cm in width. Asymmetric prominence of the left submandibular gland. Asymmetric prominence of the left tonsillar pillar at the level the oropharynx, without a discrete peritonsillar abscess collection.  - 11/15/2022 CT CAP: Multiple small left supraclavicular lymph nodes. Otherwise essentially unremarkable CT of the chest, abdomen or pelvis. No evidence of malignancy.  - Contacted ENT, Dr. Peck to review films       # Cytopenias  - Flow cytometry results: The myeloid immunophenotypic findings show no increase in myeloid immaturity.  The lymphocyte immunophenotypic findings show no diagnostic abnormalities.  INTERPRETATION:  MORPHOLOGY: Neutropenia, small lymphocytes, occasional larger lymphoid cells with nuclear atypia.  - S/p IR biopsy 11/15/2022: L neck node; pending pathology report  - Potassium, phosphorus, calcium, creatinine and uric acid stable  - ANC 1.16 as of 11/17/2022, remains afebrile    Patient stable at this time, afebrile currently, okay to discharge as per Heme/Onc Dr Gayle, follow neutropenic precautions, return to ED if fever develops, can see Heme/Onc George outpatient.         23 yo F from [facility] originally with 1 month fevers and L neck swelling. She reports B symptoms including L neck swelling, fevers worse at night, aching, white plaquing of tongue, mild malaise. She has been doing well since her admission, no acute symptoms at this time.    # B like symptoms (neck swelling, fever/chills, night sweats)  - Continuing to improve daily, no fevers/night sweats recorded   - Differential includes infectious, autoimmune, benign, lymphoma and other malignant disorders  - Common presentation of lymphoma includes lymphadenopathy, hepatosplenomegaly, fever, weight loss, and night sweats. Less common presentations include extranodal involvement, abnormal laboratory findings, and paraneoplastic syndromes.  - 11/12/22 CT Neck :   Deep to the left sternocleidomastoid muscle there are 2 enhancing mass lesions. The more superiorly located mass lesion measures 2.1 cm in height x 1.6 cm in AP diameter x 1.5 cm in width, with peripheral enhancement and central decreased attenuation, possibly representing central necrosis. The more inferiorly located mass lesion measures approximately 1.5 cm in height  x 1.1 cm in AP diameter x 1.8 cm in width. Asymmetric prominence of the left submandibular gland. Asymmetric prominence of the left tonsillar pillar at the level the oropharynx, without a discrete peritonsillar abscess collection.  - 11/15/2022 CT CAP: Multiple small left supraclavicular lymph nodes. Otherwise essentially unremarkable CT of the chest, abdomen or pelvis. No evidence of malignancy.  - outpt f/u for biopsy results and treatment recommendations      # Cytopenias  - Flow cytometry results: The myeloid immunophenotypic findings show no increase in myeloid immaturity.  The lymphocyte immunophenotypic findings show no diagnostic abnormalities.  INTERPRETATION:  MORPHOLOGY: Neutropenia, small lymphocytes, occasional larger lymphoid cells with nuclear atypia.  - S/p IR biopsy 11/15/2022: L neck node; pending pathology report  - Potassium, phosphorus, calcium, creatinine and uric acid stable  - ANC 1.16 as of 11/17/2022, remains afebrile

## 2022-11-17 NOTE — DISCHARGE NOTE PROVIDER - NSDCCPCAREPLAN_GEN_ALL_CORE_FT
PRINCIPAL DISCHARGE DIAGNOSIS  Diagnosis: Neutropenic fever  Assessment and Plan of Treatment: repeat cbc in 1 week to establish resolution   follow up with hematologist Dr. Vazquez within 1 week      SECONDARY DISCHARGE DIAGNOSES  Diagnosis: Cervical lymphadenopathy  Assessment and Plan of Treatment: you have had lymph node biopsy done on 11/15/22  please follow up with dr. Vazquez within 1 week for further care      Diagnosis: Thrombocytopenia  Assessment and Plan of Treatment: repeat blood work within 1 week    Diagnosis: Hypocalcemia  Assessment and Plan of Treatment: consider checking your 25 hydroxy  vitamin D level as outpatient with PCP and replacement as needed

## 2022-11-17 NOTE — DISCHARGE NOTE PROVIDER - DETAILS OF MALNUTRITION DIAGNOSIS/DIAGNOSES
This patient has been assessed with a concern for Malnutrition and was treated during this hospitalization for the following Nutrition diagnosis/diagnoses:     -  11/14/2022: Severe protein-calorie malnutrition

## 2022-11-17 NOTE — DISCHARGE NOTE PROVIDER - CARE PROVIDER_API CALL
Steph Carl  HEMATOLOGY  [address], [place] [address]  Phone: [phone]  Fax: [phone]  Follow Up Time: 1 week    nabil mcclain PCP  Phone: (   )    -  Fax: (   )    -  Follow Up Time: 1 week

## 2022-11-17 NOTE — DISCHARGE NOTE NURSING/CASE MANAGEMENT/SOCIAL WORK - NSDCPEFALRISK_GEN_ALL_CORE
For information on Fall & Injury Prevention, visit: [email or web address] OR  [email or web address] OR  https://www.cdc.gov/steadi/patient.html

## 2022-11-17 NOTE — DISCHARGE NOTE PROVIDER - NSDCMRMEDTOKEN_GEN_ALL_CORE_FT
acetaminophen 325 mg oral tablet: 2 tab(s) orally every 6 hours, As needed, Mild Pain (1 - 3), Moderate Pain (4 - 6)  Probiotic Formula oral capsule: 1 cap(s) orally once a day

## 2022-11-17 NOTE — DISCHARGE NOTE NURSING/CASE MANAGEMENT/SOCIAL WORK - PATIENT PORTAL LINK FT
You can access the FollowMyHealth Patient Portal offered by [facility] by registering at the following website: [email or web address]. By joining [employer]’s FollowMyHealth portal, you will also be able to view your health information using other applications (apps) compatible with our system.

## 2022-11-17 NOTE — DISCHARGE NOTE PROVIDER - NSDCCPTREATMENT_GEN_ALL_CORE_FT
PRINCIPAL PROCEDURE  Procedure: US guided biopsy of lymph node  Findings and Treatment: Pre-Op Diagnosis, Post-Op Diagnosis and Procedure:   Pre-Op, Post-Op and Procedure Selector:  ·  PRE-OP DIAGNOSIS:  Cervical lymphadenopathy 15-Nov-2022 16:02:44  Eulogio Blakely.  ·  POST-OP DIAGNOSIS:  Cervical lymphadenopathy 15-Nov-2022 16:03:17  Eulogio Blakely.  ·  PROCEDURES:  US guided biopsy of lymph node 15-Nov-2022 16:02:27  Eulogio Blakely  Operative Findings:  · Operative Findings  left neck node. 18 g temno core bx taken x 6. Three in formalin, 2 in RPMI and 1 for culture (including AFB and Fungal)  Evidence of Infection or Abscess:  · Evidence of infection or abscess identified at the start or during the surgical procedure:  No  Specimens/Blood Loss/IV/Output/Protocol/VTE:   Specimens/Blood Loss/IV/Output/Protocol/VTE:  · Specimens  left neck lymph node core bx's  · Estimated Blood Loss  0 milliLiter(s)        SECONDARY PROCEDURE  Procedure: CT neck  Findings and Treatment: IMPRESSION:  1. 2 enhancing mass lesions are appreciated deep to the left   sternocleidomastoid muscle, concerning for pathologic appearing level III   lymph nodes. While an infectious etiology is within the differential   diagnosis, findings are also concerning for the possibility of a   neoplastic process. Further assessment is advised. Consider soft tissue   sampling.  2. Asymmetric prominence of the left tonsillar pillar without evidence of   a peritonsillar abscess collection.  3. Asymmetric prominence of the left submandibular gland. As the right   submandibular gland is not clearly visualized, it is unclear if this   finding is inflammatory in etiology, congenital or postsurgical.   Correlate with patient history.      Procedure: Abdomen CT  Findings and Treatment: FINDINGS:  CHEST:  LUNGS AND LARGE AIRWAYS: Patent central airways. No pulmonarynodules.  PLEURA: No pleural effusion.  VESSELS: Within normal limits.  HEART: Heart size is normal. No pericardial effusion.  MEDIASTINUM AND VAMSHI: No lymphadenopathy.  CHEST WALL AND LOWER NECK: Multiple left supraclavicular lymph nodes   measuringup to 1 cm.  ABDOMEN AND PELVIS:  LIVER: 7 mm hypodense lesion in segment 7 which is too small to   characterize.  BILE DUCTS: Normal caliber.  GALLBLADDER: Within normal limits.  SPLEEN: Within normal limits.  PANCREAS: Within normal limits.  ADRENALS: Within normal limits.  KIDNEYS/URETERS: Within normal limits.  BLADDER: Within normal limits.  REPRODUCTIVE ORGANS: Uterus and adnexa within normal limits.  BOWEL: No bowel obstruction. Appendix is normal.  PERITONEUM: No ascites.  VESSELS: Within normal limits.  RETROPERITONEUM/LYMPH NODES: No lymphadenopathy.  ABDOMINAL WALL: Within normal limits.  BONES: Within normal limits.  IMPRESSION:  Multiple small left supraclavicular lymph nodes.  Otherwise essentially unremarkable CT of thechest, abdomen or pelvis. No   evidence of malignancy.

## 2022-11-17 NOTE — DISCHARGE NOTE PROVIDER - HOSPITAL COURSE
Subjective:  Chief complain : L neck swelling      HPI:  21 yo F from [facility] originally admitted on 11/13/22  with 1 month fevers and L neck swelling. She reports B symptoms including L neck swelling, fevers worse at night, aching, white plaquing of tongue, mild malaise. She reports 1 month ago she had what she thought was an ear infection and then developed the swelling in her neck, followed by fevers after the swelling occurred. She says that she has been prescribed several antibiotics that have not seemed to make a difference and came to the ED when the neck felt more swollen and painful despite recent change of antibiotic. She denies any FH of lymphomas, interaction with sick individuals, or any remote travel within the past 5 years. ROS neg for sob, cp, n/v/d but she has restriction of movement due to pain from L side of neck. In ED she was given cefepime and an US then a CT of her neck were performed.     Hospital course   11/15 - s/p left neck node biopsy  - pathology pending  11/17 - pt seen and examined, left neck swelling improved, afebrile, denies cp, dyspnea, abdominal pain, plan discussed     Review of system- Rest of the review of system are negative except mentioned in HPI      PHYSICAL EXAM:  Appearance: Comfortable. No acute distress  HEENT:  Head and neck: Atraumatic. Normocephalic. mild left sided neck swelling noted   Normal oral mucosa apart from white coat on tongue which is diffuse and thin, not foul smelling. PERRL, Neck has tenderness throughout, in particular L SCM but also present R side. No JVD,   Neurologic: A & O x 3, no focal deficits. EOMI , Cranial nerves are intact.  Cardiovascular: Normal S1 S2, No murmur, rubs/gallops. No JVD, No edema  Respiratory: Lungs clear to auscultation  Gastrointestinal:  Soft, Non-tender, + BS  Lower Extremities: No edema  Psychiatry: Patient is calm. No agitation. Mood & affect appropriate  Skin: No rashes/ ecchymoses/cyanosis/ulcers visualized on the face, hands or feet.    Febrile syndrome differential - infectious, autoimmune, benign, lymphome  Left neck enlarged LN s/p IR biopsy on 11/15/22 - pending pathology   - flowcytometry - no diagnostic abnormalities   - f/u with Dr. Vazquez for final biopsy results  - no evidence of infections  - d/c steroids, acyclovir, cefepime     Neutropenia, Thrombocytopenia  - o/p follow up with oncologist within 1 week  - repeat blood work in 1 week     Hypocalcemia - check your vitamin D as outpatient     Final diagnosis, treatment plan, and follow-up recommendations were discussed and explained to the patient.   The patient was given an opportunity to ask questions concerning the diagnosis and treatment plan.   The patient acknowledged understanding of the diagnosis, treatment, and follow-up recommendations.   The patient was advised to seek urgent care upon discharge if worsening symptoms develop prior to scheduled follow-up.   Time spent on discharge included time with the patient, and also coordinating discharge care as outlined below.  Discharge note faxed to PCP with my contact information to call me back   PCP Dr. Tran   Total time spent: 50 min

## 2022-11-17 NOTE — PROGRESS NOTE ADULT - SUBJECTIVE AND OBJECTIVE BOX
HPI:    23 yo F from [facility] originally with 1 month fevers and L neck swelling. She reports B symptoms including L neck swelling, fevers worse at night, aching, white plaquing of tongue, mild malaise. She reports 1 month ago she had what she thought was an ear infection and then developed the swelling in her neck, followed by fevers after the swelling occurred. She says that she has been prescribed several antibiotics that have not seemed to make a difference and came to the ED when the neck felt more swollen and painful despite recent change of antibiotic. She denies any FHx of lymphomas, interaction with sick individuals, or any remote travel within the past 5 years.     11/16/2022: Patient was seen today at bedside, feeling well, no new complaints overnight, tolerating PO intake    11/17/2022: Patient was seen today at bedside, continuing to do well, no overnight complaints      PAST MEDICAL & SURGICAL HISTORY:    No pertinent past medical history        MEDICATIONS  (STANDING):  acyclovir IVPB      acyclovir IVPB 280 milliGRAM(s) IV Intermittent every 8 hours  cefepime   IVPB 2000 milliGRAM(s) IV Intermittent every 8 hours  methylPREDNISolone sodium succinate Injectable 80 milliGRAM(s) IV Push three times a day    MEDICATIONS  (PRN):  acetaminophen     Tablet .. 650 milliGRAM(s) Oral once PRN Temp greater or equal to 38C (100.4F), Mild Pain (1 - 3)  ibuprofen  Tablet. 600 milliGRAM(s) Oral once PRN Temp greater or equal to 38C (100.4F), Mild Pain (1 - 3)      Allergies:    No Known Allergies    Intolerances:        FAMILY HISTORY:          REVIEW OF SYSTEMS:    Constitutional, Eyes, ENT, Cardiovascular, Respiratory, Gastrointestinal, Genitourinary, Musculoskeletal, Integumentary, Neurological, Psychiatric, Endocrine, Heme/Lymph and Allergic/Immunologic review of systems are otherwise negative except as noted in HPI.         Vitals:     ICU Vital Signs Last 24 Hrs  T(C): 36.6 (17 Nov 2022 08:41), Max: 36.8 (16 Nov 2022 20:33)  T(F): 97.9 (17 Nov 2022 08:41), Max: 98.2 (16 Nov 2022 20:33)  HR: 89 (17 Nov 2022 08:41) (83 - 103)  BP: 98/69 (17 Nov 2022 08:41) (98/69 - 107/63)  BP(mean): --  ABP: --  ABP(mean): --  RR: 18 (17 Nov 2022 08:41) (18 - 18)  SpO2: 100% (17 Nov 2022 08:41) (100% - 100%)    O2 Parameters below as of 17 Nov 2022 08:41  Patient On (Oxygen Delivery Method): room air          PHYSICAL EXAM:    Appearance: Comfortable. No acute distress  HEENT:  Head and neck: Atraumatic. Normocephalic. B/L SCM swelling noted, less unilateral than expected from CTA.  Normal oral mucosa apart from white coat on tongue which is diffuse and thin, not foul smelling. PERRL, Neck has tenderness throughout, in particular L SCM but also present R side. No JVD  Neurologic: A & O x 3, no focal deficits. EOMI , Cranial nerves are intact.  Cardiovascular: Normal S1 S2, No murmur, rubs/gallops. No JVD, No edema  Respiratory: Lungs clear to auscultation  Gastrointestinal:  Soft, Non-tender, + BS  Lower Extremities: No edema  Psychiatry: Patient is calm. No agitation. Mood & affect appropriate  Skin: No rashes/ ecchymoses/cyanosis/ulcers visualized on the face, hands or feet.    CURRENT MEDICATIONS:    methylPREDNISolone sodium succinate Injectable      LABS:	 	    CBC Full  -  ( 17 Nov 2022 06:38 )  WBC Count : 2.07 K/uL  RBC Count : 4.45 M/uL  Hemoglobin : 12.1 g/dL  Hematocrit : 36.0 %  Platelet Count - Automated : 105 K/uL  Mean Cell Volume : 80.9 fl  Mean Cell Hemoglobin : 27.2 pg  Mean Cell Hemoglobin Concentration : 33.6 gm/dL  Auto Neutrophil # : 1.16 K/uL  Auto Lymphocyte # : 0.70 K/uL  Auto Monocyte # : 0.14 K/uL  Auto Eosinophil # : 0.02 K/uL  Auto Basophil # : 0.00 K/uL  Auto Neutrophil % : 56.0 %  Auto Lymphocyte % : 34.0 %  Auto Monocyte % : 7.0 %  Auto Eosinophil % : 1.0 %  Auto Basophil % : 0.0 %                            12.1   2.07  )-----------( 105      ( 17 Nov 2022 06:38 )             36.0     11-17    139  |  107  |  11  ----------------------------<  92  3.6   |  28  |  0.58    Ca    8.2<L>      17 Nov 2022 06:38  Phos  3.0     11-17  Mg     2.0     11-17        RADIOLOGY:        CT Abdomen and Pelvis w/ IV Cont (11.15.22 @ 13:44)     IMPRESSION:    Multiple small left supraclavicular lymph nodes.  Otherwise essentially unremarkable CT of the chest, abdomen or pelvis. No   evidence of malignancy.          CT Neck Soft Tissue w/ IV Cont (11.13.22 @ 12:58)    FINDINGS:    Deep to the left sternocleidomastoid muscle there are 2 enhancing mass   lesions (sagittal 602-101). The more superiorly located mass lesion   measures 2.1 cm in height x 1.6 cm in AP diameter x 1.5 cm in width, with   peripheral enhancement and central decreased attenuation, possibly   representing central necrosis. The more inferiorly located mass lesion   measures approximately 1.5 cm in height  x 1.1 cm in AP diameter x 1.8 cm   in width. Asymmetric prominence of the left submandibular gland.    Asymmetricprominence of the left tonsillar pillar at the level the   oropharynx, without a discrete peritonsillar abscess collection.    Airway is patent.    PAROTID GLANDS:  Unremarkable.    THYROID GLAND:  Unremarkable.  VISUALIZED PARANASAL SINUSES:  Unremarkable.  VISUALIZED TYMPANOMASTOID CAVITIES: Unremarkable.  BONES:  Unremarkable.  MISCELLANEOUS:  Unremarkable.    IMPRESSION:    1. 2 enhancing mass lesions are appreciated deep to the left   sternocleidomastoid muscle, concerning for pathologic appearing level III   lymph nodes. While an infectious etiology is within the differential   diagnosis, findings are also concerning for the possibility of a   neoplastic process. Further assessment is advised. Consider soft tissue   sampling.  2. Asymmetric prominence of the left tonsillar pillar without evidence of   a peritonsillar abscess collection.  3. Asymmetric prominence of the left submandibular gland. As the right   submandibular gland is not clearly visualized, it is unclear if this   finding is inflammatory in etiology, congenital or postsurgical.   Correlate with patient history.    Findings were communicated by Dr. Behr Ventura to MICHELLE Tenorio in the   emergency department at approximately 1:30 PM on 11/13/2022.          US Head + Neck Soft Tissue (11.13.22 @ 12:18)    FINDINGS:    Right Lobe: 4.5 cm x  1.5 cm x 0.6 cm. Homogeneous echogenicity. No   nodule.    Left Lobe: 4.3 cm x 1.1 cm x 0.8 cm. Homogeneous echogenicity. No nodule.    Isthmus: 7 cm. No nodule.    CervicalLymph Nodes: Several enlarged lymph nodes with thickened cortex   in the left anterior cervical chain. Reference nodes as follows:  Level 3 lymph node measures 2.5 x 1.4 cm  Level 4 lymph node measures 1.5 x 1.1 cm.    IMPRESSION:    Lymph node enlargement left levels 3 and 4.    Normal appearance of the thyroid gland.          Xray Chest 2 Views PA/Lat (11.11.22 @ 15:05)   PA lateral.    Heart and mediastinum normal.  Lungs clear.  Costophrenic angles sharp   bilaterally.    IMPRESSION: Normal chest          IR Procedure (11.15.22 @ 16:03)    Impression:    1. Successful ultrasound-guided left cervical lymph node biopsy                     HPI:    23 yo F from [facility] originally with 1 month fevers and L neck swelling. She reports B symptoms including L neck swelling, fevers worse at night, aching, white plaquing of tongue, mild malaise. She reports 1 month ago she had what she thought was an ear infection and then developed the swelling in her neck, followed by fevers after the swelling occurred. She says that she has been prescribed several antibiotics that have not seemed to make a difference and came to the ED when the neck felt more swollen and painful despite recent change of antibiotic. She denies any FHx of lymphomas, interaction with sick individuals, or any remote travel within the past 5 years.     11/16/2022: Patient was seen today at bedside, feeling well, no new complaints overnight, tolerating PO intake    11/17/2022: Patient was seen today at bedside, continuing to do well, no overnight complaints      PAST MEDICAL & SURGICAL HISTORY:    No pertinent past medical history        MEDICATIONS  (STANDING):  acyclovir IVPB      acyclovir IVPB 280 milliGRAM(s) IV Intermittent every 8 hours  cefepime   IVPB 2000 milliGRAM(s) IV Intermittent every 8 hours  methylPREDNISolone sodium succinate Injectable 80 milliGRAM(s) IV Push three times a day    MEDICATIONS  (PRN):  acetaminophen     Tablet .. 650 milliGRAM(s) Oral once PRN Temp greater or equal to 38C (100.4F), Mild Pain (1 - 3)  ibuprofen  Tablet. 600 milliGRAM(s) Oral once PRN Temp greater or equal to 38C (100.4F), Mild Pain (1 - 3)      Allergies:    No Known Allergies    Intolerances:        FAMILY HISTORY:          REVIEW OF SYSTEMS:    Constitutional, Eyes, ENT, Cardiovascular, Respiratory, Gastrointestinal, Genitourinary, Musculoskeletal, Integumentary, Neurological, Psychiatric, Endocrine, Heme/Lymph and Allergic/Immunologic review of systems are otherwise negative except as noted in HPI.         Vitals:     ICU Vital Signs Last 24 Hrs  T(C): 36.6 (17 Nov 2022 08:41), Max: 36.8 (16 Nov 2022 20:33)  T(F): 97.9 (17 Nov 2022 08:41), Max: 98.2 (16 Nov 2022 20:33)  HR: 89 (17 Nov 2022 08:41) (83 - 103)  BP: 98/69 (17 Nov 2022 08:41) (98/69 - 107/63)  BP(mean): --  ABP: --  ABP(mean): --  RR: 18 (17 Nov 2022 08:41) (18 - 18)  SpO2: 100% (17 Nov 2022 08:41) (100% - 100%)    O2 Parameters below as of 17 Nov 2022 08:41  Patient On (Oxygen Delivery Method): room air          PHYSICAL EXAM:    Appearance: Comfortable. No acute distress  HEENT: Atraumatic. Normocephalic. PERRLA. No JVD. B/L SCM swelling with mild TTP noted (L>R) improved from initial physical upon admission.  Normal oral mucosa apart from white coat on tongue which is diffuse and thin, not foul smelling.  Neurologic: A & O x 3, no focal deficits. EOMI , Cranial nerves are intact.  Cardiovascular: Normal S1 S2, No murmur, rubs/gallops. No JVD, No edema  Respiratory: Lungs clear to auscultation  Gastrointestinal:  Soft, Non-tender, + BS  Lower Extremities: No edema  Psychiatry: Patient is calm. No agitation. Mood & affect appropriate  Skin: No rashes/ ecchymoses/cyanosis/ulcers visualized on the face, hands or feet.    CURRENT MEDICATIONS:    methylPREDNISolone sodium succinate Injectable      LABS:	 	    CBC Full  -  ( 17 Nov 2022 06:38 )  WBC Count : 2.07 K/uL  RBC Count : 4.45 M/uL  Hemoglobin : 12.1 g/dL  Hematocrit : 36.0 %  Platelet Count - Automated : 105 K/uL  Mean Cell Volume : 80.9 fl  Mean Cell Hemoglobin : 27.2 pg  Mean Cell Hemoglobin Concentration : 33.6 gm/dL  Auto Neutrophil # : 1.16 K/uL  Auto Lymphocyte # : 0.70 K/uL  Auto Monocyte # : 0.14 K/uL  Auto Eosinophil # : 0.02 K/uL  Auto Basophil # : 0.00 K/uL  Auto Neutrophil % : 56.0 %  Auto Lymphocyte % : 34.0 %  Auto Monocyte % : 7.0 %  Auto Eosinophil % : 1.0 %  Auto Basophil % : 0.0 %                            12.1   2.07  )-----------( 105      ( 17 Nov 2022 06:38 )             36.0     11-17    139  |  107  |  11  ----------------------------<  92  3.6   |  28  |  0.58    Ca    8.2<L>      17 Nov 2022 06:38  Phos  3.0     11-17  Mg     2.0     11-17        RADIOLOGY:        CT Abdomen and Pelvis w/ IV Cont (11.15.22 @ 13:44)     IMPRESSION:    Multiple small left supraclavicular lymph nodes.  Otherwise essentially unremarkable CT of the chest, abdomen or pelvis. No   evidence of malignancy.          CT Neck Soft Tissue w/ IV Cont (11.13.22 @ 12:58)    FINDINGS:    Deep to the left sternocleidomastoid muscle there are 2 enhancing mass   lesions (sagittal 602-101). The more superiorly located mass lesion   measures 2.1 cm in height x 1.6 cm in AP diameter x 1.5 cm in width, with   peripheral enhancement and central decreased attenuation, possibly   representing central necrosis. The more inferiorly located mass lesion   measures approximately 1.5 cm in height  x 1.1 cm in AP diameter x 1.8 cm   in width. Asymmetric prominence of the left submandibular gland.    Asymmetricprominence of the left tonsillar pillar at the level the   oropharynx, without a discrete peritonsillar abscess collection.    Airway is patent.    PAROTID GLANDS:  Unremarkable.    THYROID GLAND:  Unremarkable.  VISUALIZED PARANASAL SINUSES:  Unremarkable.  VISUALIZED TYMPANOMASTOID CAVITIES: Unremarkable.  BONES:  Unremarkable.  MISCELLANEOUS:  Unremarkable.    IMPRESSION:    1. 2 enhancing mass lesions are appreciated deep to the left   sternocleidomastoid muscle, concerning for pathologic appearing level III   lymph nodes. While an infectious etiology is within the differential   diagnosis, findings are also concerning for the possibility of a   neoplastic process. Further assessment is advised. Consider soft tissue   sampling.  2. Asymmetric prominence of the left tonsillar pillar without evidence of   a peritonsillar abscess collection.  3. Asymmetric prominence of the left submandibular gland. As the right   submandibular gland is not clearly visualized, it is unclear if this   finding is inflammatory in etiology, congenital or postsurgical.   Correlate with patient history.    Findings were communicated by Dr. Behr Ventura to MICHELLE Tenorio in the   emergency department at approximately 1:30 PM on 11/13/2022.          US Head + Neck Soft Tissue (11.13.22 @ 12:18)    FINDINGS:    Right Lobe: 4.5 cm x  1.5 cm x 0.6 cm. Homogeneous echogenicity. No   nodule.    Left Lobe: 4.3 cm x 1.1 cm x 0.8 cm. Homogeneous echogenicity. No nodule.    Isthmus: 7 cm. No nodule.    CervicalLymph Nodes: Several enlarged lymph nodes with thickened cortex   in the left anterior cervical chain. Reference nodes as follows:  Level 3 lymph node measures 2.5 x 1.4 cm  Level 4 lymph node measures 1.5 x 1.1 cm.    IMPRESSION:    Lymph node enlargement left levels 3 and 4.    Normal appearance of the thyroid gland.          Xray Chest 2 Views PA/Lat (11.11.22 @ 15:05)   PA lateral.    Heart and mediastinum normal.  Lungs clear.  Costophrenic angles sharp   bilaterally.    IMPRESSION: Normal chest          IR Procedure (11.15.22 @ 16:03)    Impression:    1. Successful ultrasound-guided left cervical lymph node biopsy

## 2022-11-18 LAB
CULTURE RESULTS: SIGNIFICANT CHANGE UP
CULTURE RESULTS: SIGNIFICANT CHANGE UP
SPECIMEN SOURCE: SIGNIFICANT CHANGE UP
SPECIMEN SOURCE: SIGNIFICANT CHANGE UP
